# Patient Record
Sex: MALE | Race: WHITE
[De-identification: names, ages, dates, MRNs, and addresses within clinical notes are randomized per-mention and may not be internally consistent; named-entity substitution may affect disease eponyms.]

---

## 2017-01-10 ENCOUNTER — PRIOR ORIGINAL RECORDS (OUTPATIENT)
Dept: OTHER | Age: 82
End: 2017-01-10

## 2017-02-10 ENCOUNTER — HOSPITAL ENCOUNTER (EMERGENCY)
Facility: HOSPITAL | Age: 82
Discharge: HOME | End: 2017-02-10
Attending: EMERGENCY MEDICINE
Payer: MEDICARE

## 2017-02-10 VITALS
WEIGHT: 154.31 LBS | DIASTOLIC BLOOD PRESSURE: 51 MMHG | OXYGEN SATURATION: 95 % | SYSTOLIC BLOOD PRESSURE: 127 MMHG | HEIGHT: 69 IN | HEART RATE: 61 BPM | RESPIRATION RATE: 16 BRPM | BODY MASS INDEX: 22.85 KG/M2 | TEMPERATURE: 98 F

## 2017-02-10 DIAGNOSIS — R04.0 RIGHT-SIDED EPISTAXIS: Primary | ICD-10-CM

## 2017-02-10 PROCEDURE — 30903 CONTROL OF NOSEBLEED: CPT

## 2017-02-10 PROCEDURE — 99283 EMERGENCY DEPT VISIT LOW MDM: CPT

## 2017-02-10 RX ORDER — AMOXICILLIN 875 MG/1
875 TABLET, COATED ORAL ONCE
Status: COMPLETED | OUTPATIENT
Start: 2017-02-10 | End: 2017-02-10

## 2017-02-10 RX ORDER — AMOXICILLIN 875 MG/1
875 TABLET, COATED ORAL 2 TIMES DAILY
Qty: 20 TABLET | Refills: 0 | Status: SHIPPED | OUTPATIENT
Start: 2017-02-10 | End: 2017-02-20

## 2017-02-10 NOTE — ED NOTES
Spoke to [employer], patient's daughter. Went over discharge instructions/follow up/meds with [employer] and she verbalized understanding. Per [employer], her sister Lupis West is on her way to  patient.

## 2017-02-10 NOTE — ED PROVIDER NOTES
Patient Seen in: [facility] Emergency Department    History   Patient presents with:  Nose Bleed (nasopharyngeal)    Stated Complaint: nose bleed    HPI    Patient is a pleasant 42-year-old male, presenting for evaluation of nosebleed.   Patient stat mg by mouth daily with food. No family history on file.       Smoking Status: Former Smoker                   Packs/Day: 0.00  Years:           Types: Cigarettes    Smokeless Status: Never Used                          Review of Systems    Positive fo Reviewed - No data to display    MDM     Patient was placed on the cart and evaluated. 5.5 cm rapid Rhino was placed in the right nostril without difficulty. Balloon was inflated and secured.     Patient was observed in the emergency department and reeval

## 2017-03-08 ENCOUNTER — PRIOR ORIGINAL RECORDS (OUTPATIENT)
Dept: OTHER | Age: 82
End: 2017-03-08

## 2017-03-11 ENCOUNTER — HOSPITAL ENCOUNTER (OUTPATIENT)
Dept: LAB | Facility: HOSPITAL | Age: 82
Discharge: HOME | End: 2017-03-11
Attending: INTERNAL MEDICINE
Payer: MEDICARE

## 2017-03-11 ENCOUNTER — PRIOR ORIGINAL RECORDS (OUTPATIENT)
Dept: OTHER | Age: 82
End: 2017-03-11

## 2017-03-11 LAB
BASOPHILS # BLD AUTO: 0.03 X10(3) UL (ref 0–0.1)
BASOPHILS NFR BLD AUTO: 0.5 %
BUN BLD-MCNC: 13 MG/DL (ref 8–20)
CALCIUM BLD-MCNC: 9.2 MG/DL (ref 8.3–10.3)
CHLORIDE: 108 MMOL/L (ref 101–111)
CO2: 29 MMOL/L (ref 22–32)
CREAT BLD-MCNC: 1.06 MG/DL (ref 0.7–1.3)
EOSINOPHIL # BLD AUTO: 0.33 X10(3) UL (ref 0–0.3)
EOSINOPHIL NFR BLD AUTO: 5 %
ERYTHROCYTE [DISTWIDTH] IN BLOOD BY AUTOMATED COUNT: 13.2 % (ref 11.5–16)
GLUCOSE BLD-MCNC: 106 MG/DL (ref 70–99)
HCT VFR BLD AUTO: 42.9 % (ref 37–53)
HGB BLD-MCNC: 14.5 G/DL (ref 13–17)
IMMATURE GRANULOCYTE COUNT: 0.02 X10(3) UL (ref 0–1)
IMMATURE GRANULOCYTE RATIO %: 0.3 %
LYMPHOCYTES # BLD AUTO: 0.72 X10(3) UL (ref 0.9–4)
LYMPHOCYTES NFR BLD AUTO: 10.9 %
MCH RBC QN AUTO: 32.9 PG (ref 27–33.2)
MCHC RBC AUTO-ENTMCNC: 33.8 G/DL (ref 31–37)
MCV RBC AUTO: 97.3 FL (ref 80–99)
MONOCYTES # BLD AUTO: 0.59 X10(3) UL (ref 0.1–0.6)
MONOCYTES NFR BLD AUTO: 8.9 %
NEUTROPHIL ABS PRELIM: 4.94 X10 (3) UL (ref 1.3–6.7)
NEUTROPHILS # BLD AUTO: 4.94 X10(3) UL (ref 1.3–6.7)
NEUTROPHILS NFR BLD AUTO: 74.4 %
PLATELET # BLD AUTO: 165 10(3)UL (ref 150–450)
POTASSIUM SERPL-SCNC: 4.4 MMOL/L (ref 3.6–5.1)
RBC # BLD AUTO: 4.41 X10(6)UL (ref 3.8–5.8)
RED CELL DISTRIBUTION WIDTH-SD: 47.4 FL (ref 35.1–46.3)
SODIUM SERPL-SCNC: 144 MMOL/L (ref 136–144)
WBC # BLD AUTO: 6.6 X10(3) UL (ref 4–13)

## 2017-03-11 PROCEDURE — 80048 BASIC METABOLIC PNL TOTAL CA: CPT | Performed by: INTERNAL MEDICINE

## 2017-03-11 PROCEDURE — 85025 COMPLETE CBC W/AUTO DIFF WBC: CPT | Performed by: INTERNAL MEDICINE

## 2017-03-11 PROCEDURE — 36415 COLL VENOUS BLD VENIPUNCTURE: CPT | Performed by: INTERNAL MEDICINE

## 2017-03-13 ENCOUNTER — PRIOR ORIGINAL RECORDS (OUTPATIENT)
Dept: OTHER | Age: 82
End: 2017-03-13

## 2017-03-16 LAB
BUN: 13 MG/DL
CALCIUM: 9.2 MG/DL
CHLORIDE: 108 MEQ/L
CREATININE, SERUM: 1.06 MG/DL
GLUCOSE: 106 MG/DL
HEMATOCRIT: 42.9 %
HEMOGLOBIN: 14.5 G/DL
PLATELETS: 165 K/UL
POTASSIUM, SERUM: 4.4 MEQ/L
RED BLOOD COUNT: 4.41 X 10-6/U
SODIUM: 144 MEQ/L
WHITE BLOOD COUNT: 6.6 X 10-3/U

## 2017-03-20 ENCOUNTER — MYAURORA ACCOUNT LINK (OUTPATIENT)
Dept: OTHER | Age: 82
End: 2017-03-20

## 2017-03-20 ENCOUNTER — PRIOR ORIGINAL RECORDS (OUTPATIENT)
Dept: OTHER | Age: 82
End: 2017-03-20

## 2017-04-05 ENCOUNTER — MYAURORA ACCOUNT LINK (OUTPATIENT)
Dept: OTHER | Age: 82
End: 2017-04-05

## 2017-07-02 ENCOUNTER — HOSPITAL ENCOUNTER (OUTPATIENT)
Age: 82
Discharge: HOME | End: 2017-07-02
Attending: FAMILY MEDICINE
Payer: MEDICARE

## 2017-07-02 ENCOUNTER — APPOINTMENT (OUTPATIENT)
Dept: GENERAL RADIOLOGY | Age: 82
End: 2017-07-02
Attending: FAMILY MEDICINE
Payer: MEDICARE

## 2017-07-02 VITALS
OXYGEN SATURATION: 95 % | RESPIRATION RATE: 16 BRPM | DIASTOLIC BLOOD PRESSURE: 55 MMHG | HEIGHT: 69 IN | SYSTOLIC BLOOD PRESSURE: 112 MMHG | BODY MASS INDEX: 23.7 KG/M2 | TEMPERATURE: 98 F | WEIGHT: 160 LBS | HEART RATE: 62 BPM

## 2017-07-02 DIAGNOSIS — T07.XXXA MULTIPLE CONTUSIONS: ICD-10-CM

## 2017-07-02 DIAGNOSIS — S66.912A WRIST STRAIN, LEFT, INITIAL ENCOUNTER: ICD-10-CM

## 2017-07-02 DIAGNOSIS — S63.259A CLOSED DISLOCATION OF FINGER OF LEFT HAND, INITIAL ENCOUNTER: Primary | ICD-10-CM

## 2017-07-02 PROCEDURE — 99204 OFFICE O/P NEW MOD 45 MIN: CPT

## 2017-07-02 PROCEDURE — 99214 OFFICE O/P EST MOD 30 MIN: CPT

## 2017-07-02 PROCEDURE — 29130 APPL FINGER SPLINT STATIC: CPT

## 2017-07-02 PROCEDURE — 73140 X-RAY EXAM OF FINGER(S): CPT | Performed by: FAMILY MEDICINE

## 2017-07-02 PROCEDURE — 73110 X-RAY EXAM OF WRIST: CPT | Performed by: FAMILY MEDICINE

## 2017-07-02 RX ORDER — FLUTICASONE PROPIONATE AND SALMETEROL 500; 50 UG/1; UG/1
1 POWDER RESPIRATORY (INHALATION) 2 TIMES DAILY
COMMUNITY
End: 2017-11-16 | Stop reason: ALTCHOICE
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
## 2017-09-27 ENCOUNTER — PRIOR ORIGINAL RECORDS (OUTPATIENT)
Dept: OTHER | Age: 82
End: 2017-09-27

## 2017-09-30 ENCOUNTER — HOSPITAL ENCOUNTER (OUTPATIENT)
Dept: GENERAL RADIOLOGY | Age: 82
Discharge: HOME | End: 2017-09-30
Attending: INTERNAL MEDICINE
Payer: MEDICARE

## 2017-09-30 DIAGNOSIS — R05.9 COUGH: ICD-10-CM

## 2017-09-30 PROCEDURE — 71020 XR CHEST PA + LAT CHEST (CPT=71020): CPT | Performed by: INTERNAL MEDICINE

## 2017-11-16 ENCOUNTER — APPOINTMENT (OUTPATIENT)
Dept: CT IMAGING | Facility: HOSPITAL | Age: 82
End: 2017-11-16
Attending: EMERGENCY MEDICINE
Payer: MEDICARE

## 2017-11-16 ENCOUNTER — HOSPITAL ENCOUNTER (OUTPATIENT)
Age: 82
Discharge: ANOTHER HOSPITAL | End: 2017-11-16
Attending: FAMILY MEDICINE
Payer: MEDICARE

## 2017-11-16 ENCOUNTER — APPOINTMENT (OUTPATIENT)
Dept: GENERAL RADIOLOGY | Facility: HOSPITAL | Age: 82
End: 2017-11-16
Attending: EMERGENCY MEDICINE
Payer: MEDICARE

## 2017-11-16 ENCOUNTER — HOSPITAL ENCOUNTER (EMERGENCY)
Facility: HOSPITAL | Age: 82
Discharge: HOME | End: 2017-11-16
Attending: EMERGENCY MEDICINE
Payer: MEDICARE

## 2017-11-16 VITALS
HEART RATE: 70 BPM | RESPIRATION RATE: 20 BRPM | SYSTOLIC BLOOD PRESSURE: 139 MMHG | HEIGHT: 69 IN | OXYGEN SATURATION: 98 % | DIASTOLIC BLOOD PRESSURE: 60 MMHG | BODY MASS INDEX: 22.96 KG/M2 | WEIGHT: 155 LBS | TEMPERATURE: 98 F

## 2017-11-16 VITALS
HEIGHT: 69 IN | OXYGEN SATURATION: 96 % | RESPIRATION RATE: 18 BRPM | SYSTOLIC BLOOD PRESSURE: 113 MMHG | HEART RATE: 62 BPM | WEIGHT: 160 LBS | BODY MASS INDEX: 23.7 KG/M2 | DIASTOLIC BLOOD PRESSURE: 48 MMHG | TEMPERATURE: 98 F

## 2017-11-16 DIAGNOSIS — S51.811A SKIN TEAR OF RIGHT FOREARM WITHOUT COMPLICATION, INITIAL ENCOUNTER: ICD-10-CM

## 2017-11-16 DIAGNOSIS — S50.01XA CONTUSION OF RIGHT ELBOW, INITIAL ENCOUNTER: ICD-10-CM

## 2017-11-16 DIAGNOSIS — S80.01XA CONTUSION OF RIGHT KNEE, INITIAL ENCOUNTER: Primary | ICD-10-CM

## 2017-11-16 DIAGNOSIS — S09.90XA INJURY OF HEAD, INITIAL ENCOUNTER: Primary | ICD-10-CM

## 2017-11-16 DIAGNOSIS — S80.01XA CONTUSION OF RIGHT KNEE AND LOWER LEG, INITIAL ENCOUNTER: ICD-10-CM

## 2017-11-16 DIAGNOSIS — S80.11XA CONTUSION OF RIGHT KNEE AND LOWER LEG, INITIAL ENCOUNTER: ICD-10-CM

## 2017-11-16 DIAGNOSIS — R29.6 UNWITNESSED FALL: ICD-10-CM

## 2017-11-16 PROCEDURE — 99284 EMERGENCY DEPT VISIT MOD MDM: CPT

## 2017-11-16 PROCEDURE — 73700 CT LOWER EXTREMITY W/O DYE: CPT | Performed by: EMERGENCY MEDICINE

## 2017-11-16 PROCEDURE — 76377 3D RENDER W/INTRP POSTPROCES: CPT | Performed by: EMERGENCY MEDICINE

## 2017-11-16 PROCEDURE — 99215 OFFICE O/P EST HI 40 MIN: CPT

## 2017-11-16 PROCEDURE — 73560 X-RAY EXAM OF KNEE 1 OR 2: CPT | Performed by: EMERGENCY MEDICINE

## 2017-11-16 RX ORDER — FLUTICASONE PROPIONATE AND SALMETEROL 250; 50 UG/1; UG/1
1 POWDER RESPIRATORY (INHALATION) EVERY 12 HOURS SCHEDULED
Status: ON HOLD | COMMUNITY
End: 2019-10-26

## 2017-11-16 RX ORDER — ACETAMINOPHEN 500 MG
1000 TABLET ORAL ONCE
Status: COMPLETED | OUTPATIENT
Start: 2017-11-16 | End: 2017-11-16

## 2017-11-17 NOTE — ED INITIAL ASSESSMENT (HPI)
Pt to c/o right knee pain and swelling - S/P fall 2x on Tue 11/14/17. Pt lives alone at [facility], fall was unwitnessed. Pt claims no LOC, but felt \"I was about to. Aubree Sue \".  Pt was sent from [address] [place] [address] for further eval.

## 2017-11-17 NOTE — ED PROVIDER NOTES
Patient Seen in: [address] [facility]    History   Patient presents with:  Fall (musculoskeletal, neurologic)    Stated Complaint: fall x2 days hurt right knee and right arm     HPI    80year old male presents for head injury, right 175.3 cm (5' 9\")   Wt 70.3 kg   SpO2 98%   BMI 22.89 kg/m²         Physical Exam   Constitutional: He is oriented to person, place, and time. He appears well-developed and well-nourished. HENT:   Head: Normocephalic and atraumatic.    Right Ear: Tympanic Tyrell [id number] [phone]  Today  For re-check        Medications Prescribed:  Discharge Medication List as of 11/16/2017  7:16 PM

## 2017-11-17 NOTE — ED PROVIDER NOTES
Patient Seen in: [facility] Emergency Department    History   Patient presents with:  Head Neck Injury (neurologic, musculoskeletal)    Stated Complaint: fell on Tuesday. Pain in right leg and arm.   Seen at urgent care and sent in f*    HPI    This (Temporal)   Resp 18   Ht 175.3 cm (5' 9\")   Wt 72.6 kg   SpO2 96%   BMI 23.63 kg/m²         Physical Exam    GENERAL: Awake, alert oriented x3, nontoxic appearing. SKIN: Normal, warm, and dry. HEENT: Atraumatic. No scalp hematomas.   Pupils equally ro to marked osteoarthritic changes are noted. Finding suspicious for CPPD are noted. Cortical lucency on the lateral view in the ventral femoral metaphysis is noted. Please correlate with targeted physical examination for pain at this site.  If the patient h right knee x-ray was obtained which showed osteoarthritis. Tiny suprapatellar joint effusion. There was a cortical lucency on the lateral view which could have been artifact. Follow-up CT was done which showed no fracture.   Family is comfortable taking p

## 2017-11-17 NOTE — ED NOTES
Gel foam placed to r forearm and r elbow noted no active bleeding at this time pt awaiting ct scan results

## 2017-11-17 NOTE — ED NOTES
Pt presents with daugther awake and alert to norm with complaint of R  knee pain and R  arm pain noted dressing to arm

## 2018-01-01 ENCOUNTER — HOSPITAL (OUTPATIENT)
Dept: OTHER | Age: 83
End: 2018-01-01
Attending: INTERNAL MEDICINE

## 2018-01-01 ENCOUNTER — DIAGNOSTIC TRANS (OUTPATIENT)
Dept: OTHER | Age: 83
End: 2018-01-01

## 2018-01-01 LAB
2009 H1N1 SUBTYPE (RF1N1): NOT DETECTED
ADENOVIRUS (RADENO): NOT DETECTED
ALBUMIN FLD-MCNC: 1.8 GM/DL
ALBUMIN SERPL-MCNC: 2.7 GM/DL (ref 3.6–5.1)
ALBUMIN/GLOB SERPL: 0.7 {RATIO} (ref 1–2.4)
ALP SERPL-CCNC: 75 UNIT/L (ref 45–117)
ALT SERPL-CCNC: 24 UNIT/L
AMORPH SED URNS QL MICRO: ABNORMAL
AMYLASE FLD-CCNC: 93 UNIT/L
ANALYZER ANC (IANC): ABNORMAL
ANION GAP SERPL CALC-SCNC: 12 MMOL/L (ref 10–20)
ANION GAP SERPL CALC-SCNC: 13 MMOL/L (ref 10–20)
ANION GAP SERPL CALC-SCNC: 14 MMOL/L (ref 10–20)
ANION GAP SERPL CALC-SCNC: 14 MMOL/L (ref 10–20)
ANION GAP SERPL CALC-SCNC: 16 MMOL/L (ref 10–20)
APPEARANCE FLD: NORMAL
APPEARANCE FLD: YELLOW
APPEARANCE UR: CLEAR
APTT PPP: 30 SECONDS (ref 22–32)
APTT PPP: NORMAL S
AST SERPL-CCNC: 33 UNIT/L
BACTERIA #/AREA URNS HPF: ABNORMAL /HPF
BASOPHILS # BLD: 0 THOUSAND/MCL (ref 0–0.3)
BASOPHILS # BLD: 0 THOUSAND/MCL (ref 0–0.3)
BASOPHILS NFR BLD: 0 %
BASOPHILS NFR BLD: 1 %
BASOPHILS NFR BRONCH MANUAL: NORMAL %
BILIRUB SERPL-MCNC: 0.5 MG/DL (ref 0.2–1)
BILIRUB UR QL: NEGATIVE
BOCAVIRUS (RBOCA): NOT DETECTED
BUN SERPL-MCNC: 14 MG/DL (ref 6–20)
BUN SERPL-MCNC: 22 MG/DL (ref 6–20)
BUN SERPL-MCNC: 23 MG/DL (ref 6–20)
BUN SERPL-MCNC: 25 MG/DL (ref 6–20)
BUN SERPL-MCNC: 29 MG/DL (ref 6–20)
BUN/CREAT SERPL: 13 (ref 7–25)
BUN/CREAT SERPL: 20 (ref 7–25)
BUN/CREAT SERPL: 20 (ref 7–25)
BUN/CREAT SERPL: 22 (ref 7–25)
BUN/CREAT SERPL: 26 (ref 7–25)
C. PNEUMONIAE (RCHLP): NOT DETECTED
CALCIUM SERPL-MCNC: 8 MG/DL (ref 8.4–10.2)
CALCIUM SERPL-MCNC: 8 MG/DL (ref 8.4–10.2)
CALCIUM SERPL-MCNC: 8.4 MG/DL (ref 8.4–10.2)
CALCIUM SERPL-MCNC: 8.5 MG/DL (ref 8.4–10.2)
CALCIUM SERPL-MCNC: 8.8 MG/DL (ref 8.4–10.2)
CAOX CRY URNS QL MICRO: ABNORMAL
CHLORIDE: 102 MMOL/L (ref 98–107)
CHLORIDE: 102 MMOL/L (ref 98–107)
CHLORIDE: 103 MMOL/L (ref 98–107)
CHLORIDE: 105 MMOL/L (ref 98–107)
CHLORIDE: 106 MMOL/L (ref 98–107)
CHOLEST SERPL-MCNC: 173 MG/DL
CHOLEST/HDLC SERPL: 5.1 {RATIO}
CO2 SERPL-SCNC: 26 MMOL/L (ref 21–32)
COLOR UR: YELLOW
CORONAVIRUS 229E (RC229E): NOT DETECTED
CORONAVIRUS HKU1 (RCHKU1): NOT DETECTED
CORONAVIRUS NL63 (RCNL63): NOT DETECTED
CORONAVIRUS OC43 (RCO43): NOT DETECTED
CREAT SERPL-MCNC: 1.09 MG/DL (ref 0.67–1.17)
CREAT SERPL-MCNC: 1.1 MG/DL (ref 0.67–1.17)
CREAT SERPL-MCNC: 1.13 MG/DL (ref 0.67–1.17)
CREAT SERPL-MCNC: 1.15 MG/DL (ref 0.67–1.17)
DIFFERENTIAL METHOD BLD: ABNORMAL
DIFFERENTIAL METHOD BLD: ABNORMAL
EOSINOPHIL # BLD: 0 THOUSAND/MCL (ref 0.1–0.5)
EOSINOPHIL # BLD: 0.1 THOUSAND/MCL (ref 0.1–0.5)
EOSINOPHIL NFR BLD: 1 %
EOSINOPHIL NFR BLD: 2 %
EOSINOPHIL NFR BRONCH MANUAL: NORMAL %
EPITH CASTS #/AREA URNS LPF: ABNORMAL /[LPF]
ERYTHROCYTE [DISTWIDTH] IN BLOOD: 13.2 % (ref 11–15)
ERYTHROCYTE [DISTWIDTH] IN BLOOD: 13.2 % (ref 11–15)
ERYTHROCYTE [DISTWIDTH] IN BLOOD: 13.3 % (ref 11–15)
FATTY CASTS #/AREA URNS LPF: ABNORMAL /[LPF]
GLOBULIN SER-MCNC: 3.7 GM/DL (ref 2–4)
GLUCOSE FLD-MCNC: 113 MG/DL
GLUCOSE SERPL-MCNC: 100 MG/DL (ref 65–99)
GLUCOSE SERPL-MCNC: 103 MG/DL (ref 65–99)
GLUCOSE SERPL-MCNC: 104 MG/DL (ref 65–99)
GLUCOSE SERPL-MCNC: 110 MG/DL (ref 65–99)
GLUCOSE SERPL-MCNC: 131 MG/DL (ref 65–99)
GLUCOSE UR-MCNC: NEGATIVE MG/DL
GLYCOHEMOGLOBIN: 5.6 % (ref 4.5–5.6)
GRAN CASTS #/AREA URNS LPF: ABNORMAL /[LPF]
HCT VFR FLD CALC: <1 %
HDLC SERPL-MCNC: 34 MG/DL
HEMATOCRIT: 41.8 % (ref 39–51)
HEMATOCRIT: 43.3 % (ref 39–51)
HEMATOCRIT: 48.8 % (ref 39–51)
HGB BLD-MCNC: 13.8 GM/DL (ref 13–17)
HGB BLD-MCNC: 13.8 GM/DL (ref 13–17)
HGB BLD-MCNC: 15.6 GM/DL (ref 13–17)
HGB UR QL: ABNORMAL
HYALINE CASTS #/AREA URNS LPF: ABNORMAL /LPF (ref 0–5)
IMM GRANULOCYTES # BLD AUTO: 0 THOUSAND/MCL (ref 0–0.2)
IMM GRANULOCYTES # BLD AUTO: 0 THOUSAND/MCL (ref 0–0.2)
IMM GRANULOCYTES NFR BLD: 0 %
IMM GRANULOCYTES NFR BLD: 1 %
INFLUENZA A SUBTYPE H1 (RFLH1): NOT DETECTED
INFLUENZA A SUBTYPE H3 (RFLH3): NOT DETECTED
INFLUENZA A UNSUBTYPABLE (RIAU): ABNORMAL
INFLUENZA B VIRUS (XFLUB): NOT DETECTED
INR PPP: 1.1
KETONES UR-MCNC: ABNORMAL MG/DL
LDH FLD-CCNC: 77 UNIT/L
LDLC SERPL CALC-MCNC: 123 MG/DL
LEUKOCYTE ESTERASE UR QL STRIP: ABNORMAL
LYMPHOCYTES # BLD: 0.5 THOUSAND/MCL (ref 1–4)
LYMPHOCYTES # BLD: 0.7 THOUSAND/MCL (ref 1–4)
LYMPHOCYTES NFR BLD: 13 %
LYMPHOCYTES NFR BLD: 9 %
LYMPHOCYTES NFR BRONCH MANUAL: 45 %
M. PNEUMONIAE (RMYPP): NOT DETECTED
MAGNESIUM SERPL-MCNC: 1.9 MG/DL (ref 1.7–2.4)
MAGNESIUM SERPL-MCNC: 1.9 MG/DL (ref 1.7–2.4)
MCH RBC QN AUTO: 31.7 PG (ref 26–34)
MCH RBC QN AUTO: 31.9 PG (ref 26–34)
MCH RBC QN AUTO: 32.9 PG (ref 26–34)
MCHC RBC AUTO-ENTMCNC: 31.9 GM/DL (ref 32–36.5)
MCHC RBC AUTO-ENTMCNC: 32 GM/DL (ref 32–36.5)
MCHC RBC AUTO-ENTMCNC: 33 GM/DL (ref 32–36.5)
MCV RBC AUTO: 99.3 FL (ref 78–100)
MCV RBC AUTO: 99.8 FL (ref 78–100)
MCV RBC AUTO: 99.8 FL (ref 78–100)
MESOTHL CELL NFR FLD: 2 %
METAPNEUMOVIRUS (RMETA): NOT DETECTED
MIXED CELL CASTS #/AREA URNS LPF: ABNORMAL /[LPF]
MONOCYTES # BLD: 0.5 THOUSAND/MCL (ref 0.3–0.9)
MONOCYTES # BLD: 0.6 THOUSAND/MCL (ref 0.3–0.9)
MONOCYTES NFR BLD: 12 %
MONOCYTES NFR BLD: 8 %
MONOS+MACROS NFR BRONCH MANUAL: 44 %
MUCOUS THREADS URNS QL MICRO: PRESENT
NEUTROPHILS # BLD: 2.8 THOUSAND/MCL (ref 1.8–7.7)
NEUTROPHILS # BLD: 6.8 THOUSAND/MCL (ref 1.8–7.7)
NEUTROPHILS NFR BLD: 72 %
NEUTROPHILS NFR BLD: 81 %
NEUTS SEG NFR BLD: ABNORMAL %
NEUTS SEG NFR BLD: ABNORMAL %
NEUTS SEG NFR FLD: 9 %
NITRITE UR QL: NEGATIVE
NONHDLC SERPL-MCNC: 139 MG/DL
NRBC (NRBCRE): 0 /100 WBC
NT-PROBNP SERPL-MCNC: 2131 PG/ML
NUC CELL # FLD: 163 /MCL (ref 0–1000)
PARAINFLUENZA, TYPE 1 (RPAR1): NOT DETECTED
PARAINFLUENZA, TYPE 2 (RPAR2): POSITIVE
PARAINFLUENZA, TYPE 3 (RPAR3): NOT DETECTED
PARAINFLUENZA, TYPE 4 (RPAR4): NOT DETECTED
PATH REPORT, CYTOLOGY: NORMAL
PH UR: 5 UNIT (ref 5–7)
PLATELET # BLD: 115 THOUSAND/MCL (ref 140–450)
PLATELET # BLD: 118 THOUSAND/MCL (ref 140–450)
PLATELET # BLD: 143 THOUSAND/MCL (ref 140–450)
POTASSIUM SERPL-SCNC: 3.5 MMOL/L (ref 3.4–5.1)
POTASSIUM SERPL-SCNC: 3.7 MMOL/L (ref 3.4–5.1)
POTASSIUM SERPL-SCNC: 4 MMOL/L (ref 3.4–5.1)
POTASSIUM SERPL-SCNC: 4.1 MMOL/L (ref 3.4–5.1)
POTASSIUM SERPL-SCNC: 4.3 MMOL/L (ref 3.4–5.1)
POTASSIUM SERPL-SCNC: 4.4 MMOL/L (ref 3.4–5.1)
PROCALCITONIN SERPL IA-MCNC: 0.33 NG/ML
PROCALCITONIN SERPL IA-MCNC: 1.57 NG/ML
PROT FLD-MCNC: 3 GM/DL
PROT SERPL-MCNC: 6.4 GM/DL (ref 6.4–8.2)
PROT UR QL: NEGATIVE MG/DL
PROTHROMBIN TIME: 11.1 SECONDS (ref 9.7–11.8)
PROTHROMBIN TIME: NORMAL
RBC # BLD: 4.19 MILLION/MCL (ref 4.5–5.9)
RBC # BLD: 4.36 MILLION/MCL (ref 4.5–5.9)
RBC # BLD: 4.89 MILLION/MCL (ref 4.5–5.9)
RBC #/AREA URNS HPF: ABNORMAL /HPF (ref 0–3)
RBC CASTS #/AREA URNS LPF: ABNORMAL /[LPF]
RENAL EPI CELLS #/AREA URNS HPF: ABNORMAL /[HPF]
RHINOVIRUS/ENTEROVIRUS (RRHINO): NOT DETECTED
RSV, SUBTYPE A (RRSVA): NOT DETECTED
RSV, SUBTYPE B (RRSVB): NOT DETECTED
SERVICE CMNT-IMP: NORMAL
SODIUM SERPL-SCNC: 136 MMOL/L (ref 135–145)
SODIUM SERPL-SCNC: 139 MMOL/L (ref 135–145)
SODIUM SERPL-SCNC: 140 MMOL/L (ref 135–145)
SODIUM SERPL-SCNC: 140 MMOL/L (ref 135–145)
SODIUM SERPL-SCNC: 142 MMOL/L (ref 135–145)
SP GR UR: 1.01 (ref 1–1.03)
SPECIMEN SOURCE FLD: NORMAL
SPECIMEN SOURCE: ABNORMAL
SPECIMEN SOURCE: ABNORMAL
SPECIMEN SOURCE: NORMAL
SPECIMEN SOURCE: NORMAL
SPECIMEN VOL FLD: 1450 ML
SPERM URNS QL MICRO: ABNORMAL
SQUAMOUS #/AREA URNS HPF: ABNORMAL /HPF (ref 0–5)
T VAGINALIS URNS QL MICRO: ABNORMAL
TRI-PHOS CRY URNS QL MICRO: ABNORMAL
TRIGLYCERIDE (TRIGP): 82 MG/DL
TROPONIN I SERPL HS-MCNC: 0.04 NG/ML
TROPONIN I SERPL HS-MCNC: 0.5 NG/ML
TROPONIN I SERPL HS-MCNC: 0.66 NG/ML
URATE CRY URNS QL MICRO: ABNORMAL
URINE REFLEX: ABNORMAL
URNS CMNT MICRO: ABNORMAL
UROBILINOGEN UR QL: 0.2 MG/DL (ref 0–1)
WAXY CASTS #/AREA URNS LPF: ABNORMAL /[LPF]
WBC # BLD: 3.9 THOUSAND/MCL (ref 4.2–11)
WBC # BLD: 5 THOUSAND/MCL (ref 4.2–11)
WBC # BLD: 8.3 THOUSAND/MCL (ref 4.2–11)
WBC #/AREA URNS HPF: ABNORMAL /HPF (ref 0–5)
WBC CASTS #/AREA URNS LPF: ABNORMAL /[LPF]
YEAST HYPHAE URNS QL MICRO: ABNORMAL
YEAST URNS QL MICRO: ABNORMAL

## 2018-01-09 ENCOUNTER — PRIOR ORIGINAL RECORDS (OUTPATIENT)
Dept: OTHER | Age: 83
End: 2018-01-09

## 2018-04-09 ENCOUNTER — MYAURORA ACCOUNT LINK (OUTPATIENT)
Dept: OTHER | Age: 83
End: 2018-04-09

## 2018-08-01 ENCOUNTER — MYAURORA ACCOUNT LINK (OUTPATIENT)
Dept: OTHER | Age: 83
End: 2018-08-01

## 2018-08-09 ENCOUNTER — HOSPITAL ENCOUNTER (OUTPATIENT)
Age: 83
Discharge: HOME | End: 2018-08-09
Attending: FAMILY MEDICINE
Payer: MEDICARE

## 2018-08-09 VITALS
BODY MASS INDEX: 22.19 KG/M2 | HEIGHT: 70 IN | OXYGEN SATURATION: 98 % | TEMPERATURE: 98 F | DIASTOLIC BLOOD PRESSURE: 59 MMHG | SYSTOLIC BLOOD PRESSURE: 138 MMHG | WEIGHT: 155 LBS | RESPIRATION RATE: 20 BRPM | HEART RATE: 69 BPM

## 2018-08-09 DIAGNOSIS — S51.812A SKIN TEAR OF LEFT FOREARM WITHOUT COMPLICATION, INITIAL ENCOUNTER: Primary | ICD-10-CM

## 2018-08-09 PROCEDURE — 99212 OFFICE O/P EST SF 10 MIN: CPT

## 2018-08-10 NOTE — ED NOTES
Wound care with Mepilex dressing, wrapped with Kerlix. Pt. Tolerated well. Daughter reports understanding .

## 2018-08-10 NOTE — ED INITIAL ASSESSMENT (HPI)
ADULT VIDEOFLUOROSCOPIC SWALLOWING STUDY    Admission Date: 8/3/2018  Evaluation Date: 08/08/18  Radiologist: Dr. Charlotte Patterson   Diet Recommendations - Solids: Mechanical soft chopped  Diet Recommendations - Liquid: Nectar thick (Chin tuck/ C/o skin tear to left forearm from 2 days ago. Pt. Does take aspirin daily. Family has been bandaging it, but feel it needs more assessment. aspiration      Family/Patient Goals:  \"I want to drink regular water. \"    ASSESSMENT   DYSPHAGIA ASSESSMENT  Test completed in conjunction with Radiologist.  Patient Positioned: Upright. Patient Viewed: Lateral.  Patient Alertness: Fully alert.   Consis sips;Multiple swallows  Effectiveness: Yes     PUREE  Oral Phase of Swallow (VFSS - Puree): Impaired  Bolus Retrieval (VFSS - Puree): Intact  Bilabial Seal (VFSS - Puree): Intact  Bolus Formation (VFSS - Puree): Intact  Bolus Propulsion (VFSS - Puree):  Imp thin liquids reduced the amount of laryngeal penetration but continued to be present. Chin tuck posture eliminated penetration on nectar thick liquids.   Trace-minimal vallecular residue was present on all consistencies post the swallow which cleared with satisfaction. INTERDISCIPLINARY COMMUNICATION  Reviewed results with Radiologist; agreement verbalized. Patient Experiencing Pain: No                FOLLOW UP  Treatment Plan/Recommendations: Dysphagia therapy; Aspiration precautions  Number of Visits

## 2018-08-10 NOTE — ED PROVIDER NOTES
Patient Seen in: [address] [facility]    History   Patient presents with:  Wound    Stated Complaint: arm laceration x2 days    HPI    80-year-old male with a history of CHF, COPD, coronary disease, and dementia suffered a skin tear General: Pleasant elderly male in no acute distress accompanied by daughter. Neuro: Sensation intact. No focal deficit  Vascular: +2 radial and ulnar pulses of the left upper extremity.   Normal cap refill of all fingers  Extremity: No acute deformity not

## 2018-08-16 ENCOUNTER — OFFICE VISIT (OUTPATIENT)
Dept: WOUND CARE | Facility: HOSPITAL | Age: 83
End: 2018-08-16
Attending: NURSE PRACTITIONER
Payer: MEDICARE

## 2018-08-16 DIAGNOSIS — L90.9 ATROPHY, SKIN: ICD-10-CM

## 2018-08-16 DIAGNOSIS — S41.102D UNSPECIFIED OPEN WOUND OF LEFT UPPER ARM, SUBSEQUENT ENCOUNTER: Primary | ICD-10-CM

## 2018-08-16 PROCEDURE — 99214 OFFICE O/P EST MOD 30 MIN: CPT

## 2018-08-16 NOTE — PROGRESS NOTES
Subjective    Chief Complaint  This information was obtained from the patient  Patients here for initial visit for forearm. Patients stated happen last Tuesday cause by skin tear.     Allergies  Coumadin, ephedrine    HPI  This information was obtained from Musculoskeletal: Assistive Devices (walker), Other (neck pain which patient was treated with steroid by pcp)  Integumentary (Hair/Skin/Nails):  Other (skin tear), Prone to Skin Tears (atrophic skin consistent with age)  Psychiatric: Memory Loss    Patient d Gastrointestinal (GI):  Abdomen is soft and non-distended without tenderness. Bowel sounds active. Musculoskeletal:  Gait independent with assistance of walker. Integumentary (Hair, Skin)  see wound documentation.      Wound #1 Left Forearm is an ac Wound type: - traumatic skin tear  Perfusion assessed by palpation of pulses.         Discussed with patient and daughter the aspects of wound healing including:  blood flow in/out, wound bed optimization including moist wound healing, removal of necrosis a

## 2018-08-20 ENCOUNTER — PRIOR ORIGINAL RECORDS (OUTPATIENT)
Dept: OTHER | Age: 83
End: 2018-08-20

## 2018-08-23 ENCOUNTER — OFFICE VISIT (OUTPATIENT)
Dept: WOUND CARE | Facility: HOSPITAL | Age: 83
End: 2018-08-23
Attending: NURSE PRACTITIONER
Payer: MEDICARE

## 2018-08-23 DIAGNOSIS — L90.9 ATROPHIC SPOTS OF SKIN: ICD-10-CM

## 2018-08-23 DIAGNOSIS — S41.102D UNSPECIFIED OPEN WOUND OF LEFT UPPER ARM, SUBSEQUENT ENCOUNTER: Primary | ICD-10-CM

## 2018-08-23 PROCEDURE — 99213 OFFICE O/P EST LOW 20 MIN: CPT

## 2018-08-23 NOTE — PROGRESS NOTES
Subjective    Chief Complaint  This information was obtained from the patient  Patients here for a f/u to skin tear on left forearm. Pt and daughter deny any problems with wound.     Allergies  Coumadin, ephedrine    HPI  This information was obtained from for patient. Pulse Regular and wnl for patient. Ana María Drayton Respirations easy and unlabored. Temperature wnl. Weight normal for height. . Appearance neat and clean. Appears in no acute distress. Well nourished and well developed.  Height/Length: 70 in (177.8 cm), Weigh - saad  Hydrofera ready  Other: - adherent roll gauze  Other: - surgilast  Change dressing 3x/week    Follow-Up Appointments  Return Appointment in 1 week. Misc/Additional Orders  Supplement with a daily multivitamin.   S/S of Infection    Care summary

## 2018-08-30 ENCOUNTER — OFFICE VISIT (OUTPATIENT)
Dept: WOUND CARE | Facility: HOSPITAL | Age: 83
End: 2018-08-30
Attending: NURSE PRACTITIONER
Payer: MEDICARE

## 2018-08-30 DIAGNOSIS — S41.102D UNSPECIFIED OPEN WOUND OF LEFT UPPER ARM, SUBSEQUENT ENCOUNTER: Primary | ICD-10-CM

## 2018-08-30 PROCEDURE — 99213 OFFICE O/P EST LOW 20 MIN: CPT

## 2018-08-30 NOTE — PROGRESS NOTES
Subjective    Chief Complaint  This information was obtained from the patient  Patients here for a f/u to skin tear on left forearm. Pt daughter stated it is doing well.      Allergies  Coumadin, ephedrine    HPI  This information was obtained from the brenda Depression    Additional Information  Medication reconciliation completed at today's visit. : Yes        Objective    Constitutional  bp wnl for patient. Pulse Regular and wnl for patient. Itzel Band Respirations easy and unlabored. Temperature wnl.  Weight normal fo RETURN TO CLINIC AS NEEDED. Misc/Additional Orders  Supplement with a daily multivitamin. S/S of Infection    Care summary  Wound type: - traumatic skin tear  Wound improving. No s/s of infection. - RESOLVED  Perfusion assessed by palpation of pulses.

## 2018-09-06 ENCOUNTER — APPOINTMENT (OUTPATIENT)
Dept: WOUND CARE | Facility: HOSPITAL | Age: 83
End: 2018-09-06
Attending: NURSE PRACTITIONER
Payer: MEDICARE

## 2018-11-07 ENCOUNTER — MYAURORA ACCOUNT LINK (OUTPATIENT)
Dept: OTHER | Age: 83
End: 2018-11-07

## 2019-01-01 ENCOUNTER — TELEPHONE (OUTPATIENT)
Dept: CARDIOLOGY | Age: 84
End: 2019-01-01

## 2019-01-01 ENCOUNTER — OFFICE VISIT (OUTPATIENT)
Dept: CARDIOLOGY | Age: 84
End: 2019-01-01

## 2019-01-01 ENCOUNTER — ANCILLARY ORDERS (OUTPATIENT)
Dept: CARDIOLOGY | Age: 84
End: 2019-01-01

## 2019-01-01 ENCOUNTER — PRIOR ORIGINAL RECORDS (OUTPATIENT)
Dept: OTHER | Age: 84
End: 2019-01-01

## 2019-01-01 ENCOUNTER — APPOINTMENT (OUTPATIENT)
Dept: CARDIOLOGY | Age: 84
End: 2019-01-01

## 2019-01-01 ENCOUNTER — EXTERNAL RECORD (OUTPATIENT)
Dept: HEALTH INFORMATION MANAGEMENT | Facility: OTHER | Age: 84
End: 2019-01-01

## 2019-01-01 ENCOUNTER — MYAURORA ACCOUNT LINK (OUTPATIENT)
Dept: OTHER | Age: 84
End: 2019-01-01

## 2019-01-01 ENCOUNTER — APPOINTMENT (OUTPATIENT)
Dept: CARDIOLOGY | Age: 84
End: 2019-01-01
Attending: INTERNAL MEDICINE

## 2019-01-01 ENCOUNTER — ANCILLARY PROCEDURE (OUTPATIENT)
Dept: CARDIOLOGY | Age: 84
End: 2019-01-01
Attending: INTERNAL MEDICINE

## 2019-01-01 VITALS
SYSTOLIC BLOOD PRESSURE: 118 MMHG | WEIGHT: 153 LBS | HEART RATE: 65 BPM | HEIGHT: 67 IN | DIASTOLIC BLOOD PRESSURE: 70 MMHG | BODY MASS INDEX: 24.01 KG/M2

## 2019-01-01 VITALS
WEIGHT: 163 LBS | SYSTOLIC BLOOD PRESSURE: 110 MMHG | HEART RATE: 64 BPM | BODY MASS INDEX: 25.58 KG/M2 | HEIGHT: 67 IN | DIASTOLIC BLOOD PRESSURE: 46 MMHG

## 2019-01-01 VITALS
BODY MASS INDEX: 25.27 KG/M2 | HEIGHT: 67 IN | DIASTOLIC BLOOD PRESSURE: 60 MMHG | HEART RATE: 65 BPM | SYSTOLIC BLOOD PRESSURE: 124 MMHG | WEIGHT: 161 LBS

## 2019-01-01 VITALS
BODY MASS INDEX: 24.11 KG/M2 | SYSTOLIC BLOOD PRESSURE: 110 MMHG | HEIGHT: 66 IN | DIASTOLIC BLOOD PRESSURE: 40 MMHG | HEART RATE: 60 BPM | WEIGHT: 150 LBS

## 2019-01-01 VITALS
WEIGHT: 152 LBS | DIASTOLIC BLOOD PRESSURE: 52 MMHG | HEIGHT: 66 IN | BODY MASS INDEX: 24.43 KG/M2 | HEART RATE: 68 BPM | SYSTOLIC BLOOD PRESSURE: 100 MMHG

## 2019-01-01 VITALS
HEIGHT: 67 IN | WEIGHT: 152 LBS | BODY MASS INDEX: 23.86 KG/M2 | DIASTOLIC BLOOD PRESSURE: 62 MMHG | HEART RATE: 80 BPM | SYSTOLIC BLOOD PRESSURE: 124 MMHG

## 2019-01-01 VITALS
WEIGHT: 154 LBS | HEIGHT: 67 IN | BODY MASS INDEX: 24.17 KG/M2 | HEART RATE: 62 BPM | SYSTOLIC BLOOD PRESSURE: 130 MMHG | DIASTOLIC BLOOD PRESSURE: 56 MMHG

## 2019-01-01 VITALS — WEIGHT: 161 LBS | SYSTOLIC BLOOD PRESSURE: 134 MMHG | DIASTOLIC BLOOD PRESSURE: 62 MMHG | HEART RATE: 72 BPM

## 2019-01-01 VITALS
HEIGHT: 66 IN | SYSTOLIC BLOOD PRESSURE: 104 MMHG | BODY MASS INDEX: 24.43 KG/M2 | HEART RATE: 66 BPM | DIASTOLIC BLOOD PRESSURE: 54 MMHG | WEIGHT: 152 LBS

## 2019-01-01 VITALS
HEART RATE: 72 BPM | DIASTOLIC BLOOD PRESSURE: 70 MMHG | SYSTOLIC BLOOD PRESSURE: 118 MMHG | WEIGHT: 162 LBS | HEIGHT: 67 IN | BODY MASS INDEX: 25.43 KG/M2

## 2019-01-01 VITALS — DIASTOLIC BLOOD PRESSURE: 58 MMHG | HEART RATE: 65 BPM | SYSTOLIC BLOOD PRESSURE: 122 MMHG | WEIGHT: 161 LBS

## 2019-01-01 VITALS
BODY MASS INDEX: 23.46 KG/M2 | WEIGHT: 146 LBS | HEIGHT: 66 IN | SYSTOLIC BLOOD PRESSURE: 110 MMHG | HEART RATE: 72 BPM | DIASTOLIC BLOOD PRESSURE: 40 MMHG

## 2019-01-01 VITALS — HEART RATE: 64 BPM | DIASTOLIC BLOOD PRESSURE: 62 MMHG | SYSTOLIC BLOOD PRESSURE: 140 MMHG

## 2019-01-01 DIAGNOSIS — I50.30 HEART FAILURE WITH PRESERVED LEFT VENTRICULAR FUNCTION (HFPEF) (CMD): Primary | ICD-10-CM

## 2019-01-01 DIAGNOSIS — J90 PLEURAL EFFUSION: ICD-10-CM

## 2019-01-01 DIAGNOSIS — Z95.0 PACEMAKER: ICD-10-CM

## 2019-01-01 DIAGNOSIS — I48.0 AF (PAROXYSMAL ATRIAL FIBRILLATION) (CMD): Primary | ICD-10-CM

## 2019-01-01 DIAGNOSIS — I48.0 AF (PAROXYSMAL ATRIAL FIBRILLATION) (CMD): ICD-10-CM

## 2019-01-01 DIAGNOSIS — Z09 HOSPITAL DISCHARGE FOLLOW-UP: Primary | ICD-10-CM

## 2019-01-01 DIAGNOSIS — I50.21 HEART FAILURE, LEFT SYSTOLIC, ACUTE (CMD): ICD-10-CM

## 2019-01-01 DIAGNOSIS — I70.203 ATHEROSCLEROSIS OF NATIVE ARTERY OF BOTH LOWER EXTREMITIES, WITH UNSPECIFIED PRESENCE OF CLINICAL MANIFESTATION (CMD): Primary | ICD-10-CM

## 2019-01-01 DIAGNOSIS — I48.0 PAROXYSMAL ATRIAL FIBRILLATION (CMD): ICD-10-CM

## 2019-01-01 DIAGNOSIS — I48.91 ATRIAL FIBRILLATION, UNSPECIFIED TYPE (CMD): ICD-10-CM

## 2019-01-01 PROCEDURE — 99213 OFFICE O/P EST LOW 20 MIN: CPT | Performed by: INTERNAL MEDICINE

## 2019-01-01 PROCEDURE — 99214 OFFICE O/P EST MOD 30 MIN: CPT | Performed by: NURSE PRACTITIONER

## 2019-01-01 PROCEDURE — 99221 1ST HOSP IP/OBS SF/LOW 40: CPT | Performed by: INTERNAL MEDICINE

## 2019-01-01 PROCEDURE — 99232 SBSQ HOSP IP/OBS MODERATE 35: CPT | Performed by: NURSE PRACTITIONER

## 2019-01-01 PROCEDURE — 99232 SBSQ HOSP IP/OBS MODERATE 35: CPT | Performed by: INTERNAL MEDICINE

## 2019-01-01 PROCEDURE — 93294 REM INTERROG EVL PM/LDLS PM: CPT | Performed by: INTERNAL MEDICINE

## 2019-01-01 PROCEDURE — 99214 OFFICE O/P EST MOD 30 MIN: CPT | Performed by: INTERNAL MEDICINE

## 2019-01-01 PROCEDURE — 99223 1ST HOSP IP/OBS HIGH 75: CPT | Performed by: INTERNAL MEDICINE

## 2019-01-01 PROCEDURE — 99233 SBSQ HOSP IP/OBS HIGH 50: CPT | Performed by: INTERNAL MEDICINE

## 2019-01-01 PROCEDURE — 93288 INTERROG EVL PM/LDLS PM IP: CPT | Performed by: NURSE PRACTITIONER

## 2019-01-01 PROCEDURE — X1114 CARDIAC DEVICE HOME CHECK - REMOTE UNSCHEDULED: HCPCS | Performed by: INTERNAL MEDICINE

## 2019-01-01 PROCEDURE — 99291 CRITICAL CARE FIRST HOUR: CPT | Performed by: INTERNAL MEDICINE

## 2019-01-01 RX ORDER — ESCITALOPRAM OXALATE 5 MG/1
TABLET ORAL
COMMUNITY
End: 2019-01-01 | Stop reason: SDUPTHER

## 2019-01-01 RX ORDER — FLUTICASONE PROPIONATE AND SALMETEROL 250; 50 UG/1; UG/1
POWDER RESPIRATORY (INHALATION)
COMMUNITY
End: 2019-01-01 | Stop reason: SDUPTHER

## 2019-01-01 RX ORDER — FUROSEMIDE 20 MG/1
20 TABLET ORAL
COMMUNITY
End: 2019-01-01 | Stop reason: ALTCHOICE

## 2019-01-01 RX ORDER — BUPROPION HYDROCHLORIDE 150 MG/1
TABLET, EXTENDED RELEASE ORAL
COMMUNITY
End: 2019-01-01 | Stop reason: SDUPTHER

## 2019-01-01 RX ORDER — PEAK FLOW METER
EACH MISCELLANEOUS
Refills: 1 | COMMUNITY
Start: 2019-01-01

## 2019-01-01 RX ORDER — FLUTICASONE PROPIONATE AND SALMETEROL 250; 50 UG/1; UG/1
1 POWDER RESPIRATORY (INHALATION) EVERY 12 HOURS PRN
COMMUNITY
Start: 2015-12-18

## 2019-01-01 RX ORDER — ALFUZOSIN HYDROCHLORIDE 10 MG/1
10 TABLET, EXTENDED RELEASE ORAL
COMMUNITY
Start: 2019-01-01

## 2019-01-01 RX ORDER — FUROSEMIDE 20 MG/1
TABLET ORAL
Qty: 30 TABLET | Refills: 5 | Status: SHIPPED | OUTPATIENT
Start: 2019-01-01 | End: 2019-01-01 | Stop reason: SDUPTHER

## 2019-01-01 RX ORDER — LEVOCETIRIZINE DIHYDROCHLORIDE 5 MG/1
5 TABLET, FILM COATED ORAL
COMMUNITY

## 2019-01-01 RX ORDER — ASPIRIN 81 MG/1
81 TABLET ORAL DAILY
COMMUNITY
Start: 2018-01-09

## 2019-01-01 RX ORDER — METOPROLOL SUCCINATE 25 MG/1
12.5 TABLET, EXTENDED RELEASE ORAL
COMMUNITY
End: 2019-01-01 | Stop reason: ALTCHOICE

## 2019-01-01 RX ORDER — FUROSEMIDE 20 MG/1
20 TABLET ORAL DAILY
COMMUNITY
Start: 2019-01-01 | End: 2019-01-01 | Stop reason: SDUPTHER

## 2019-01-01 RX ORDER — FUROSEMIDE 40 MG/1
40 TABLET ORAL DAILY
COMMUNITY

## 2019-01-01 RX ORDER — METOPROLOL SUCCINATE 25 MG/1
TABLET, EXTENDED RELEASE ORAL
COMMUNITY
End: 2019-01-01 | Stop reason: SDUPTHER

## 2019-01-01 RX ORDER — CITALOPRAM 20 MG/1
20 TABLET ORAL DAILY
COMMUNITY

## 2019-01-01 RX ORDER — LISINOPRIL 2.5 MG/1
2.5 TABLET ORAL DAILY
COMMUNITY
Start: 2019-01-01 | End: 2019-01-01 | Stop reason: SDUPTHER

## 2019-01-01 RX ORDER — MONTELUKAST SODIUM 10 MG/1
10 TABLET ORAL AT BEDTIME
COMMUNITY
Start: 2015-12-18

## 2019-01-01 RX ORDER — FUROSEMIDE 40 MG/1
40 TABLET ORAL DAILY
COMMUNITY
End: 2019-01-01 | Stop reason: HOSPADM

## 2019-01-01 RX ORDER — MEMANTINE HYDROCHLORIDE 10 MG/1
TABLET ORAL
COMMUNITY

## 2019-01-01 RX ORDER — METOPROLOL SUCCINATE 25 MG/1
12.5 TABLET, EXTENDED RELEASE ORAL DAILY
COMMUNITY
End: 2019-01-01 | Stop reason: CLARIF

## 2019-01-01 RX ORDER — MEMANTINE HYDROCHLORIDE 10 MG/1
10 TABLET ORAL 2 TIMES DAILY
COMMUNITY
Start: 2015-12-18 | End: 2019-01-01 | Stop reason: SDUPTHER

## 2019-01-01 RX ORDER — LISINOPRIL 2.5 MG/1
2.5 TABLET ORAL DAILY
Qty: 90 TABLET | Refills: 3 | Status: SHIPPED | OUTPATIENT
Start: 2019-01-01 | End: 2019-01-01 | Stop reason: ALTCHOICE

## 2019-01-01 RX ORDER — PRIMIDONE 50 MG/1
TABLET ORAL
COMMUNITY
End: 2019-01-01 | Stop reason: SDUPTHER

## 2019-01-01 RX ORDER — CITALOPRAM HYDROBROMIDE 10 MG/1
10 TABLET ORAL DAILY
COMMUNITY
End: 2019-01-01 | Stop reason: DRUGHIGH

## 2019-01-01 RX ORDER — PRIMIDONE 50 MG/1
50 TABLET ORAL 3 TIMES DAILY
COMMUNITY
Start: 2015-12-18

## 2019-01-01 RX ORDER — MONTELUKAST SODIUM 10 MG/1
TABLET ORAL
COMMUNITY
End: 2019-01-01 | Stop reason: SDUPTHER

## 2019-01-01 RX ORDER — AMIODARONE HYDROCHLORIDE 200 MG/1
200 TABLET ORAL
COMMUNITY
Start: 2019-01-01

## 2019-01-01 RX ORDER — METOPROLOL SUCCINATE 25 MG/1
25 TABLET, EXTENDED RELEASE ORAL 2 TIMES DAILY
COMMUNITY
Start: 2018-08-20 | End: 2019-01-01 | Stop reason: DRUGHIGH

## 2019-01-01 RX ORDER — BUPROPION HYDROCHLORIDE 300 MG/1
TABLET ORAL
Refills: 0 | COMMUNITY
Start: 2019-01-01 | End: 2019-01-01 | Stop reason: CLARIF

## 2019-01-01 RX ORDER — IPRATROPIUM BROMIDE AND ALBUTEROL SULFATE 2.5; .5 MG/3ML; MG/3ML
3 SOLUTION RESPIRATORY (INHALATION)
COMMUNITY
Start: 2019-01-01

## 2019-01-01 RX ORDER — LISINOPRIL 2.5 MG/1
TABLET ORAL
Qty: 30 TABLET | Refills: 3 | Status: SHIPPED | OUTPATIENT
Start: 2019-01-01 | End: 2019-01-01 | Stop reason: SDUPTHER

## 2019-01-01 SDOH — HEALTH STABILITY: MENTAL HEALTH: HOW OFTEN DO YOU HAVE A DRINK CONTAINING ALCOHOL?: NEVER

## 2019-01-01 SDOH — SOCIAL STABILITY: SOCIAL NETWORK: ARE YOU MARRIED, WIDOWED, DIVORCED, SEPARATED, NEVER MARRIED, OR LIVING WITH A PARTNER?: WIDOWED

## 2019-01-01 ASSESSMENT — ENCOUNTER SYMPTOMS
BRUISES/BLEEDS EASILY: 0
WEIGHT GAIN: 0
HEMATOCHEZIA: 0
SUSPICIOUS LESIONS: 0
HEMOPTYSIS: 0
COUGH: 0
ALLERGIC/IMMUNOLOGIC COMMENTS: NO NEW FOOD ALLERGIES
WEIGHT LOSS: 0
MEMORY LOSS: 1
CHILLS: 0
BRUISES/BLEEDS EASILY: 0
ALLERGIC/IMMUNOLOGIC COMMENTS: NO NEW FOOD ALLERGIES
CHILLS: 0
WEIGHT GAIN: 0
HEMATOCHEZIA: 0
HEMOPTYSIS: 0
FEVER: 0
COUGH: 0
WEIGHT LOSS: 0
FEVER: 0
SUSPICIOUS LESIONS: 0

## 2019-01-01 ASSESSMENT — PATIENT HEALTH QUESTIONNAIRE - PHQ9
SUM OF ALL RESPONSES TO PHQ9 QUESTIONS 1 AND 2: 2
2. FEELING DOWN, DEPRESSED OR HOPELESS: NOT AT ALL
1. LITTLE INTEREST OR PLEASURE IN DOING THINGS: MORE THAN HALF THE DAYS
SUM OF ALL RESPONSES TO PHQ9 QUESTIONS 1 AND 2: 2

## 2019-01-16 ENCOUNTER — HOSPITAL ENCOUNTER (OUTPATIENT)
Dept: CV DIAGNOSTICS | Facility: HOSPITAL | Age: 84
Discharge: HOME | End: 2019-01-16
Attending: INTERNAL MEDICINE
Payer: MEDICARE

## 2019-01-16 DIAGNOSIS — Z95.1 S/P CABG (CORONARY ARTERY BYPASS GRAFT): ICD-10-CM

## 2019-01-16 DIAGNOSIS — I25.10 CAD (CORONARY ARTERY DISEASE), NATIVE CORONARY ARTERY: ICD-10-CM

## 2019-01-16 DIAGNOSIS — I48.0 AF (PAROXYSMAL ATRIAL FIBRILLATION) (HCC): ICD-10-CM

## 2019-01-16 PROCEDURE — 78452 HT MUSCLE IMAGE SPECT MULT: CPT | Performed by: INTERNAL MEDICINE

## 2019-01-16 PROCEDURE — 93017 CV STRESS TEST TRACING ONLY: CPT | Performed by: INTERNAL MEDICINE

## 2019-01-16 PROCEDURE — 93018 CV STRESS TEST I&R ONLY: CPT | Performed by: INTERNAL MEDICINE

## 2019-01-30 ENCOUNTER — HOSPITAL ENCOUNTER (OUTPATIENT)
Dept: GENERAL RADIOLOGY | Facility: HOSPITAL | Age: 84
Discharge: HOME | End: 2019-01-30
Attending: INTERNAL MEDICINE
Payer: MEDICARE

## 2019-01-30 DIAGNOSIS — I25.10 CAD (CORONARY ARTERY DISEASE): ICD-10-CM

## 2019-01-30 DIAGNOSIS — J90 PLEURAL EFFUSION: ICD-10-CM

## 2019-01-30 DIAGNOSIS — I48.0 AF (PAROXYSMAL ATRIAL FIBRILLATION) (HCC): ICD-10-CM

## 2019-01-30 PROCEDURE — 71046 X-RAY EXAM CHEST 2 VIEWS: CPT | Performed by: INTERNAL MEDICINE

## 2019-02-22 ENCOUNTER — HOSPITAL ENCOUNTER (INPATIENT)
Facility: HOSPITAL | Age: 84
LOS: 5 days | Discharge: HOME HEALTH | DRG: 291 | End: 2019-02-27
Attending: HOSPITALIST | Admitting: HOSPITALIST
Payer: MEDICARE

## 2019-02-22 ENCOUNTER — APPOINTMENT (OUTPATIENT)
Dept: CT IMAGING | Facility: HOSPITAL | Age: 84
DRG: 291 | End: 2019-02-22
Attending: HOSPITALIST
Payer: MEDICARE

## 2019-02-22 DIAGNOSIS — J90 PLEURAL EFFUSION: Primary | ICD-10-CM

## 2019-02-22 DIAGNOSIS — J93.9 PNEUMOTHORAX ON RIGHT: ICD-10-CM

## 2019-02-22 PROBLEM — I50.9 CHF (CONGESTIVE HEART FAILURE) (HCC): Status: ACTIVE | Noted: 2019-02-22

## 2019-02-22 LAB
ANION GAP SERPL CALC-SCNC: 7 MMOL/L (ref 0–18)
BILIRUB UR QL STRIP.AUTO: NEGATIVE
BUN BLD-MCNC: 16 MG/DL (ref 7–18)
BUN/CREAT SERPL: 17 (ref 10–20)
CALCIUM BLD-MCNC: 8.4 MG/DL (ref 8.5–10.1)
CHLORIDE SERPL-SCNC: 106 MMOL/L (ref 98–107)
CLARITY UR REFRACT.AUTO: CLEAR
CO2 SERPL-SCNC: 29 MMOL/L (ref 21–32)
CREAT BLD-MCNC: 0.94 MG/DL (ref 0.7–1.3)
D-DIMER: 1.61 UG/ML FEU (ref 0–0.49)
DEPRECATED RDW RBC AUTO: 49.9 FL (ref 35.1–46.3)
ERYTHROCYTE [DISTWIDTH] IN BLOOD BY AUTOMATED COUNT: 13.4 % (ref 11–15)
GLUCOSE BLD-MCNC: 102 MG/DL (ref 70–99)
GLUCOSE UR STRIP.AUTO-MCNC: NEGATIVE MG/DL
HCT VFR BLD AUTO: 43.6 % (ref 39–53)
HGB BLD-MCNC: 14.1 G/DL (ref 13–17.5)
KETONES UR STRIP.AUTO-MCNC: NEGATIVE MG/DL
LACTATE SERPL-SCNC: 1 MMOL/L (ref 0.4–2)
LEUKOCYTE ESTERASE UR QL STRIP.AUTO: NEGATIVE
MCH RBC QN AUTO: 32.6 PG (ref 26–34)
MCHC RBC AUTO-ENTMCNC: 32.3 G/DL (ref 31–37)
MCV RBC AUTO: 100.7 FL (ref 80–100)
NITRITE UR QL STRIP.AUTO: NEGATIVE
NT-PROBNP SERPL-MCNC: 1825 PG/ML (ref ?–450)
OSMOLALITY SERPL CALC.SUM OF ELEC: 295 MOSM/KG (ref 275–295)
PH UR STRIP.AUTO: 7 [PH] (ref 4.5–8)
PLATELET # BLD AUTO: 204 10(3)UL (ref 150–450)
POTASSIUM SERPL-SCNC: 4.6 MMOL/L (ref 3.5–5.1)
PROT UR STRIP.AUTO-MCNC: NEGATIVE MG/DL
RBC # BLD AUTO: 4.33 X10(6)UL (ref 3.8–5.8)
RBC UR QL AUTO: NEGATIVE
SODIUM SERPL-SCNC: 142 MMOL/L (ref 136–145)
SP GR UR STRIP.AUTO: 1.01 (ref 1–1.03)
UROBILINOGEN UR STRIP.AUTO-MCNC: <2 MG/DL
WBC # BLD AUTO: 8.1 X10(3) UL (ref 4–11)

## 2019-02-22 PROCEDURE — 99223 1ST HOSP IP/OBS HIGH 75: CPT | Performed by: HOSPITALIST

## 2019-02-22 PROCEDURE — 71275 CT ANGIOGRAPHY CHEST: CPT | Performed by: HOSPITALIST

## 2019-02-22 RX ORDER — MONTELUKAST SODIUM 10 MG/1
10 TABLET ORAL NIGHTLY
Status: DISCONTINUED | OUTPATIENT
Start: 2019-02-22 | End: 2019-02-27

## 2019-02-22 RX ORDER — ACETAMINOPHEN 325 MG/1
650 TABLET ORAL EVERY 6 HOURS PRN
Status: DISCONTINUED | OUTPATIENT
Start: 2019-02-22 | End: 2019-02-27

## 2019-02-22 RX ORDER — MEMANTINE HYDROCHLORIDE 10 MG/1
10 TABLET ORAL 2 TIMES DAILY
Status: DISCONTINUED | OUTPATIENT
Start: 2019-02-22 | End: 2019-02-27

## 2019-02-22 RX ORDER — FUROSEMIDE 10 MG/ML
20 INJECTION INTRAMUSCULAR; INTRAVENOUS ONCE
Status: COMPLETED | OUTPATIENT
Start: 2019-02-22 | End: 2019-02-22

## 2019-02-22 RX ORDER — BUPROPION HYDROCHLORIDE 150 MG/1
150 TABLET ORAL DAILY
Status: DISCONTINUED | OUTPATIENT
Start: 2019-02-23 | End: 2019-02-27

## 2019-02-22 RX ORDER — METOPROLOL SUCCINATE 25 MG/1
25 TABLET, EXTENDED RELEASE ORAL
Status: DISCONTINUED | OUTPATIENT
Start: 2019-02-22 | End: 2019-02-27

## 2019-02-22 RX ORDER — HEPARIN SODIUM 5000 [USP'U]/ML
5000 INJECTION, SOLUTION INTRAVENOUS; SUBCUTANEOUS EVERY 8 HOURS SCHEDULED
Status: DISCONTINUED | OUTPATIENT
Start: 2019-02-22 | End: 2019-02-22

## 2019-02-22 RX ORDER — CITALOPRAM 20 MG/1
20 TABLET ORAL DAILY
COMMUNITY

## 2019-02-22 RX ORDER — ENOXAPARIN SODIUM 100 MG/ML
40 INJECTION SUBCUTANEOUS NIGHTLY
Status: DISCONTINUED | OUTPATIENT
Start: 2019-02-22 | End: 2019-02-23

## 2019-02-22 RX ORDER — PRIMIDONE 50 MG/1
50 TABLET ORAL 3 TIMES DAILY
Status: DISCONTINUED | OUTPATIENT
Start: 2019-02-22 | End: 2019-02-27

## 2019-02-22 RX ORDER — ASPIRIN 81 MG/1
81 TABLET ORAL DAILY
Status: DISCONTINUED | OUTPATIENT
Start: 2019-02-23 | End: 2019-02-27

## 2019-02-23 ENCOUNTER — APPOINTMENT (OUTPATIENT)
Dept: GENERAL RADIOLOGY | Facility: HOSPITAL | Age: 84
DRG: 291 | End: 2019-02-23
Attending: INTERNAL MEDICINE
Payer: MEDICARE

## 2019-02-23 ENCOUNTER — APPOINTMENT (OUTPATIENT)
Dept: CT IMAGING | Facility: HOSPITAL | Age: 84
DRG: 291 | End: 2019-02-23
Attending: INTERNAL MEDICINE
Payer: MEDICARE

## 2019-02-23 ENCOUNTER — APPOINTMENT (OUTPATIENT)
Dept: CV DIAGNOSTICS | Facility: HOSPITAL | Age: 84
DRG: 291 | End: 2019-02-23
Attending: INTERNAL MEDICINE
Payer: MEDICARE

## 2019-02-23 ENCOUNTER — APPOINTMENT (OUTPATIENT)
Dept: ULTRASOUND IMAGING | Facility: HOSPITAL | Age: 84
DRG: 291 | End: 2019-02-23
Attending: INTERNAL MEDICINE
Payer: MEDICARE

## 2019-02-23 LAB
ALLENS TEST: POSITIVE
ANION GAP SERPL CALC-SCNC: 7 MMOL/L (ref 0–18)
ARTERIAL BLD GAS O2 SATURATION: 95 % (ref 92–100)
ARTERIAL BLOOD GAS BASE EXCESS: 1.1
ARTERIAL BLOOD GAS HCO3: 25.3 MEQ/L (ref 22–26)
ARTERIAL BLOOD GAS PCO2: 38 MM HG (ref 35–45)
ARTERIAL BLOOD GAS PH: 7.44 (ref 7.35–7.45)
ARTERIAL BLOOD GAS PO2: 77 MM HG (ref 80–105)
BASOPHIL PLEURAL FLUID: 0 %
BUN BLD-MCNC: 15 MG/DL (ref 7–18)
BUN/CREAT SERPL: 14.4 (ref 10–20)
CALCIUM BLD-MCNC: 8.4 MG/DL (ref 8.5–10.1)
CALCULATED O2 SATURATION: 96 % (ref 92–100)
CARBOXYHEMOGLOBIN: 1.6 % SAT (ref 0–3)
CHLORIDE SERPL-SCNC: 105 MMOL/L (ref 98–107)
CO2 SERPL-SCNC: 30 MMOL/L (ref 21–32)
CREAT BLD-MCNC: 1.04 MG/DL (ref 0.7–1.3)
DEPRECATED RDW RBC AUTO: 48.2 FL (ref 35.1–46.3)
EOSINOPHIL PLEURAL FLUID: 3 %
ERYTHROCYTE [DISTWIDTH] IN BLOOD BY AUTOMATED COUNT: 13.4 % (ref 11–15)
GLUCOSE BLD-MCNC: 91 MG/DL (ref 70–99)
GLUCOSE PLR-MCNC: 101 MG/DL
HAV IGM SER QL: 2 MG/DL (ref 1.6–2.6)
HCT VFR BLD AUTO: 39.8 % (ref 39–53)
HEMATOCRIT PLEURAL FLUID: 0 %
HGB BLD-MCNC: 13.4 G/DL (ref 13–17.5)
L/M: 2 L/MIN
LDH FLD L TO P-CCNC: 82 U/L
LYMPHOCYTE PLEURAL FLUID: 56 %
MCH RBC QN AUTO: 33 PG (ref 26–34)
MCHC RBC AUTO-ENTMCNC: 33.7 G/DL (ref 31–37)
MCV RBC AUTO: 98 FL (ref 80–100)
METHEMOGLOBIN: 0.5 % SAT (ref 0.4–1.5)
MONO/MAC/HISTIO PLEURAL FLUID: 39 %
NEUTROPHIL PLEURAL FLUID: 2 %
OSMOLALITY SERPL CALC.SUM OF ELEC: 294 MOSM/KG (ref 275–295)
PATIENT TEMPERATURE: 97.6 F
PLATELET # BLD AUTO: 179 10(3)UL (ref 150–450)
PLEURAL FLUID PH: 7.52 (ref 7.2–7.5)
POTASSIUM SERPL-SCNC: 4.2 MMOL/L (ref 3.5–5.1)
PROT PLR-MCNC: 3.6 G/DL
RBC # BLD AUTO: 4.06 X10(6)UL (ref 3.8–5.8)
RBC PLEURAL FLUID: <3000 /MM3
SODIUM SERPL-SCNC: 142 MMOL/L (ref 136–145)
TOTAL CELLS COUNTED: 100
TOTAL HEMOGLOBIN: 13.9 G/DL (ref 12.6–17.4)
WBC # BLD AUTO: 6.7 X10(3) UL (ref 4–11)
WBC PLEURAL FLUID: 235 /MM3

## 2019-02-23 PROCEDURE — 71045 X-RAY EXAM CHEST 1 VIEW: CPT | Performed by: INTERNAL MEDICINE

## 2019-02-23 PROCEDURE — 0W993ZZ DRAINAGE OF RIGHT PLEURAL CAVITY, PERCUTANEOUS APPROACH: ICD-10-PCS | Performed by: INTERNAL MEDICINE

## 2019-02-23 PROCEDURE — 71250 CT THORAX DX C-: CPT | Performed by: INTERNAL MEDICINE

## 2019-02-23 PROCEDURE — 32555 ASPIRATE PLEURA W/ IMAGING: CPT | Performed by: INTERNAL MEDICINE

## 2019-02-23 PROCEDURE — 99232 SBSQ HOSP IP/OBS MODERATE 35: CPT | Performed by: HOSPITALIST

## 2019-02-23 RX ORDER — HEPARIN SODIUM 5000 [USP'U]/ML
5000 INJECTION, SOLUTION INTRAVENOUS; SUBCUTANEOUS EVERY 12 HOURS SCHEDULED
Status: DISCONTINUED | OUTPATIENT
Start: 2019-02-23 | End: 2019-02-27

## 2019-02-23 NOTE — CONSULTS
[facility]  Report of Consultation    Larisa Mcguire Patient Status:  Inpatient    1929 MRN [id number]   [facility] 8NE-A Attending Chino Kendrick MD   [address] Day # 1 PCP Som Rosales DO     Reason for Consultation:  Aurelia Khan Daily  •  Memantine HCl (NAMENDA) tab 10 mg, 10 mg, Oral, BID  •  Metoprolol Succinate ER (Toprol XL) 24 hr tab 25 mg, 25 mg, Oral, 2x Daily(Beta Blocker)  •  Montelukast Sodium (SINGULAIR) tab 10 mg, 10 mg, Oral, Nightly  •  primidone (MYSOLINE) tab 50 mg 2300 — — — 65 — 97 % —   02/22/19 2100 124/49 — — 62 — 97 % —   02/22/19 1941 (!) 168/63 97.6 °F (36.4 °C) Oral 64 18 93 % —   02/22/19 1900 — — — 64 — — —   02/22/19 1651 139/68 — — 74 17 97 % —   02/22/19 1649 150/68 — — 80 17 97 % —   02/22/19 1646 153/ n/a    Radiology:  CXR 2/22- large right pleural effusion  CT chest 2/22- large right effusion with compressive atelectasis, small left effusion.   Collection of contrast material within right heat    Assessment:  · Recurrent right pleural effusion- etiolog

## 2019-02-23 NOTE — PLAN OF CARE
Assumed care of pt at 2300. Pt denies pain. Pt is A & O x 4 w/ forgetfulness. Pt is V paced on tele, S1 and S2 present and pt denies cardiac symptoms. Lung sounds diminished w/ crackles at bases. Pt on 2L NC. Continuing to monitor. Abdomen soft and round.

## 2019-02-23 NOTE — PROGRESS NOTES
· [facility]      Subjective:  Up in chair - dyspnea markedly better after 2.8 liter thoracentesis    Objective:  108/71  123/47   Afebrile   AFib v pacing  BUN/cr 15/1.04     Neuro: awake/alert  HEENT: no JVD  Cardiac: S1 s2 regular  Lungs: c

## 2019-02-23 NOTE — H&P
[facility]  History and Physical     Trude Dies Patient Status:  Inpatient    1929 MRN [id number]   [facility] 8NE-A Attending Smiley Cooney MD   [facility] Day # 1 PCP Marcela Lala DO     Chief Complaint: sob deconditio Ephedrine                   Comment:hyperactive    Medications:    No current facility-administered medications on file prior to encounter.    Current Outpatient Medications on File Prior to Encounter:  Citalopram Hydrobromide 10 MG Oral Tab Take 5 mg by lesions. Psychiatric: Appropriate mood and affect.       Diagnostic Data:      Labs:  Recent Labs   Lab  02/22/19 1832 02/23/19   0515   WBC  8.1  6.7   HGB  14.1  13.4   MCV  100.7*  98.0   PLT  204.0  179.0       Recent Labs   Lab  02/22/19   1832  0

## 2019-02-23 NOTE — PROGRESS NOTES
[facility]  Progress Note     Zita Yu Patient Status:  Inpatient    1929 MRN [id number]   [facility] 8NE-A Attending Samantha Cheng MD   [facility] Day # 1 PCP Shruthi Jaramillo DO     Chief Complaint: SOB    S: Patient confu mg Subcutaneous Nightly       ASSESSMENT / PLAN:     1. Pleural effusion  1. pulm to see for thoracentesis  2. Will repeat CXR post thoracentesis  3. Not sure if he needs/ qualifies pleurex catheter at this time   2. Ischemic cardiomyopathy   1.  Cardiology

## 2019-02-23 NOTE — PROGRESS NOTES
[facility] Cardiology  Progress Note    Darren Bennett Patient Status:  Inpatient    1929 MRN [id number]   [facility] 8NE-A Attending Mony Simms MD   Hosp Day # 0 PCP Melquiades Aleman DO     Called on consult by [facility] push IV lasix  - cont beta- blocker  - ASA  - not a candidate for anticoagulation (h/o bleed, dementia, risk of fall) as decided in the past  - echo in am to assess LVEF - discrepancy from different hospiat    D/w pt and tele Nurses  Beryle Hampshire  02/22/20 hematuria. INTEG: no new rashes, lesions. MS: difficulty in walking. NEURO: memory loss, unsteady gait and wheelchair. HEM/LYMPH: easy bruising. ALL: no new food or enviornmental allergies.       PAST HISTORY: dementia, nose bleeds with xarelto and aspirin, to lie down. Was hypotensive with diuretics when hospitalized to [facility]. 2.  Pleural effusion moderate on the right on chest x-ray 1/30/2019. Now with progressive dyspnea, orthopnea. O2 sat 97% at rest today.   Previously required thoracentesi 12/18/15 Primidone             50MG      three times daily     Tatiana Hernandez / [facility]  02/21/2019 65

## 2019-02-23 NOTE — PROGRESS NOTES
Pulmonology:    Received call from Dr. Mariela Stringer. Results of the CT demonstrate evidence of a right base PTX with some extension to the apex, likely trapped lung but need to be cautious for PTX as well.   STAT CXR ordered for current baseline with repeat sc

## 2019-02-23 NOTE — PROCEDURES
[facility] Patient Status:  Inpatient    1929 MRN [id number]   [facility] 8NE-A Attending Dahiana Davenport MD   [facility] Day # 1 PCP Tonie Hutton DO       Pre-operative Diagnosis: recurrent right pleural effusion

## 2019-02-23 NOTE — PLAN OF CARE
Patient/Family Goals    • Patient/Family Long Term Goal Progressing    • Patient/Family Short Term Goal Progressing        Direct admit from Dr. Timbo Gonzalez office, V paced, AOx3 forgetful, 97% on room air, pt states SOB with exertion, expiratory wheezes, saf

## 2019-02-23 NOTE — PLAN OF CARE
Patient/Family Goals    • Patient/Family Long Term Goal Progressing    • Patient/Family Short Term Goal Progressing        RESPIRATORY - ADULT    • Achieves optimal ventilation and oxygenation Progressing        A/o x1-2 hes aware of his confusion and beco

## 2019-02-24 ENCOUNTER — APPOINTMENT (OUTPATIENT)
Dept: GENERAL RADIOLOGY | Facility: HOSPITAL | Age: 84
DRG: 291 | End: 2019-02-24
Attending: INTERNAL MEDICINE
Payer: MEDICARE

## 2019-02-24 ENCOUNTER — APPOINTMENT (OUTPATIENT)
Dept: CV DIAGNOSTICS | Facility: HOSPITAL | Age: 84
DRG: 291 | End: 2019-02-24
Attending: INTERNAL MEDICINE
Payer: MEDICARE

## 2019-02-24 PROCEDURE — 93306 TTE W/DOPPLER COMPLETE: CPT | Performed by: INTERNAL MEDICINE

## 2019-02-24 PROCEDURE — 71045 X-RAY EXAM CHEST 1 VIEW: CPT | Performed by: INTERNAL MEDICINE

## 2019-02-24 PROCEDURE — 99232 SBSQ HOSP IP/OBS MODERATE 35: CPT | Performed by: HOSPITALIST

## 2019-02-24 RX ORDER — FUROSEMIDE 20 MG/1
20 TABLET ORAL DAILY
Status: DISCONTINUED | OUTPATIENT
Start: 2019-02-24 | End: 2019-02-27

## 2019-02-24 RX ORDER — LISINOPRIL 2.5 MG/1
2.5 TABLET ORAL DAILY
Status: DISCONTINUED | OUTPATIENT
Start: 2019-02-24 | End: 2019-02-27

## 2019-02-24 NOTE — PLAN OF CARE
Patient/Family Goals    • Patient/Family Long Term Goal Progressing    • Patient/Family Short Term Goal Progressing        RESPIRATORY - ADULT    • Achieves optimal ventilation and oxygenation Progressing        Assumed care of patient at 0700.     A/o x2

## 2019-02-24 NOTE — PROGRESS NOTES
[facility]  Progress Note     Christa Bowens Patient Status:  Inpatient    1929 MRN [id number]   [facility] 8NE-A Attending Aster Posadas MD   [address] Day # 2 PCP Nato Chang DO     Chief Complaint: SOB    S: Patient denie Montelukast Sodium  10 mg Oral Nightly   • primidone  50 mg Oral TID       ASSESSMENT / PLAN:     1. Pleural effusion  1. s/p thoracentesis  2. Follow cytology and Cx results  3. diuresis  2. Iatrogenic pneumothorax  1. Asymptomatic  2.  Follow with serial

## 2019-02-24 NOTE — PLAN OF CARE
RESPIRATORY - ADULT    • Achieves optimal ventilation and oxygenation Progressing        Rcv'd A/o/2-3   Forgetful at times  Reported by day nurse as per daughter pt becomes more confused in hospital setting  Bed alarm noted  On tele with V-pacing  Heparin

## 2019-02-24 NOTE — PROGRESS NOTES
[facility]  Progress Note    Heather Andrade Patient Status:  Inpatient    1929 MRN [id number]   [facility] 8NE-A Attending Ashli Easton MD   [facility] Day # 2 PCP Antonia Wiley DO     Subjective:  Heather Andrade is a(n) 80 year [employer]    Cultures:  Right pleural fluid 2/23- pending, transudative    Radiology:  CXR 2/24- Shift of trapped air from base to apex, persistent basilar infiltrates/edema  CT chest 2/23 post thoracentesis- loculated air at the base and small apical PTX. worsened PTX and resp failure. Questions answered. She is agreeable to the procedure if needed. · Cardiac optimization. Prior inability to tolerate diuresis may have been in setting of concurrent infection, not on ACE or ARB.   Discussed w cardiology AP

## 2019-02-24 NOTE — PROGRESS NOTES
[facility]  Progress Note    Lawrence Wilson Patient Status:  Inpatient    1929 MRN [id number]   [facility] 8NE-A Attending Brina Barnes MD   [facility] Day # 2 PCP Christoph Pool DO       Assessment and Plan:  Patient Active Probl Medications:  furosemide (LASIX) tab 20 mg 20 mg Oral Daily   lisinopril (PRINIVIL,ZESTRIL) tab 2.5 mg 2.5 mg Oral Daily   Ipratropium Bromide (ATROVENT) 0.02 % nebulizer solution 0.5 mg 0.5 mg Nebulization Q6H PRN   Heparin Sodium (Porcine) 5000 UNIT/ML i

## 2019-02-25 ENCOUNTER — APPOINTMENT (OUTPATIENT)
Dept: GENERAL RADIOLOGY | Facility: HOSPITAL | Age: 84
DRG: 291 | End: 2019-02-25
Attending: INTERNAL MEDICINE
Payer: MEDICARE

## 2019-02-25 LAB
ALBUMIN SERPL-MCNC: 2.3 G/DL (ref 3.4–5)
ALP LIVER SERPL-CCNC: 62 U/L (ref 45–117)
ALT SERPL-CCNC: 13 U/L (ref 16–61)
ANION GAP SERPL CALC-SCNC: 5 MMOL/L (ref 0–18)
AST SERPL-CCNC: 12 U/L (ref 15–37)
BILIRUB DIRECT SERPL-MCNC: 0.1 MG/DL (ref 0–0.2)
BILIRUB SERPL-MCNC: 0.4 MG/DL (ref 0.1–2)
BUN BLD-MCNC: 25 MG/DL (ref 7–18)
BUN/CREAT SERPL: 22.1 (ref 10–20)
CALCIUM BLD-MCNC: 8 MG/DL (ref 8.5–10.1)
CHLORIDE SERPL-SCNC: 107 MMOL/L (ref 98–107)
CO2 SERPL-SCNC: 28 MMOL/L (ref 21–32)
CREAT BLD-MCNC: 1.13 MG/DL (ref 0.7–1.3)
GLUCOSE BLD-MCNC: 96 MG/DL (ref 70–99)
LDH SERPL L TO P-CCNC: 151 U/L (ref 87–241)
M PROTEIN MFR SERPL ELPH: 5.7 G/DL (ref 6.4–8.2)
OSMOLALITY SERPL CALC.SUM OF ELEC: 294 MOSM/KG (ref 275–295)
POTASSIUM SERPL-SCNC: 4.2 MMOL/L (ref 3.5–5.1)
SODIUM SERPL-SCNC: 140 MMOL/L (ref 136–145)

## 2019-02-25 PROCEDURE — 71045 X-RAY EXAM CHEST 1 VIEW: CPT | Performed by: INTERNAL MEDICINE

## 2019-02-25 PROCEDURE — 99232 SBSQ HOSP IP/OBS MODERATE 35: CPT | Performed by: HOSPITALIST

## 2019-02-25 RX ORDER — POLYETHYLENE GLYCOL 3350 17 G/17G
17 POWDER, FOR SOLUTION ORAL DAILY PRN
Status: DISCONTINUED | OUTPATIENT
Start: 2019-02-25 | End: 2019-02-27

## 2019-02-25 RX ORDER — DOCUSATE SODIUM 100 MG/1
100 CAPSULE, LIQUID FILLED ORAL 2 TIMES DAILY
Status: DISCONTINUED | OUTPATIENT
Start: 2019-02-25 | End: 2019-02-27

## 2019-02-25 NOTE — PROGRESS NOTES
02/25/19 1443   Clinical Encounter Type   Visited With Health care provider   Referral To Nurse;Physician  ( provided signed POLST form, already on file, dated 5/2015. Nurse to consult physician regarding Code Status on file.    to remain

## 2019-02-25 NOTE — PROGRESS NOTES
[facility]  Cardiology Progress Note    Pratima Troncoso Patient Status:  Inpatient    1929 MRN [id number]   [facility] 8NE-A Attending Shar Oglesby MD   [facility] Day # 3 PCP Breann Gonsalez DO     Subjective:  Sitting up in chair, Cardiac: iregular rate and rhythm, S1, S2 normal   Lungs: diminished bases   Abdomen: Soft, non-tender, + BS   Extremities: No edema   Skin: Warm and dry.      Medications:  • docusate sodium  100 mg Oral BID   • furosemide  20 mg Oral Daily   • lisinopri

## 2019-02-25 NOTE — CM/SW NOTE
MSW spoke to [employer] [facility] [place] [address] and sent updates/order via [facility].    9:55am  MSW called [phone] is from [place].

## 2019-02-25 NOTE — CM/SW NOTE
MSW spoke to r Ray Smith and discussed d/c plan is home with [facility]. [facility][note 2[facility] also pays for a private pay caregiver for an Aide for weekly showers.

## 2019-02-25 NOTE — DIETARY NOTE
[facility] R Luis     Admitting diagnosis:  Pleural Effusion  Dyspnea  CHF (congestive heart failure) ([facility] [address]    Ht:  5'10\" (8/9/2018)  Wt: 64.1 kg (141 lb 5 oz). This is  77% of IBW  BMI: Body mass index is 20.28 kg/m².

## 2019-02-25 NOTE — PLAN OF CARE
Assumed care of patient around 0730. Pt alert to self this a.m., 2L nasal cannula for pneumo, v-paced on tele monitor w/ underlying a-fib. Pt denies pain/discomfort or chest pain, SOB this a.m. C/o constipation, Dr. Dennys Kumar notified, miralax ordered.  Repor

## 2019-02-25 NOTE — PROGRESS NOTES
[facility]  Progress Note    Cynthia Kovacs Patient Status:  Inpatient    1929 MRN [id number]   [facility] 8NE-A Attending Ros Domínguez MD   [address] Day # 3 PCP Aly Toure DO     Subjective:  Cynthia Kovacs is a(n) 80 year PHOS, PHOSPHORUS     No results for input(s): PT, INR, PTT in the last 168 hours.     Recent Labs   Lab  02/22/19   2346   ABGPHT  7.44   [id number]  38   [id number]  77*   ABGHCO3  25.3   ABGBE  1.1   TEMP  97.6   ALEX  Positive   SITE  Left Radial   DEV  [place] 12/2018. Has been intolerant per report of diuresis in the past at [facility] 12/18 becoming hypotensive w diuresis.   Pneumonia also suspected at the time  · R lung entrapement, iatrogenic PTX  · CAD s/p CABG  · Chronic afib s/p pacemaker, not on anticoagul

## 2019-02-25 NOTE — PROGRESS NOTES
02/25/19 1449   Clinical Encounter Type   Visited With Patient   Referral To Nurse  (Referral made to [employer] for [address] communion as requested. )   Nondenominational Encounters   Spiritual Requests During Visit / Hospitalization [facility]

## 2019-02-25 NOTE — PROGRESS NOTES
[facility]  Progress Note     Jodie Gains Patient Status:  Inpatient    1929 MRN [id number]   [facility] 8NE-A Attending Abisai Clayton MD   [facility] Day # 3 PCP Lynsey Gaspar DO     Chief Complaint: SOB    S: Patient denie Fluticasone Furoate-Vilanterol  1 puff Inhalation Daily   • Memantine HCl  10 mg Oral BID   • Metoprolol Succinate ER  25 mg Oral 2x Daily(Beta Blocker)   • Montelukast Sodium  10 mg Oral Nightly   • primidone  50 mg Oral TID       ASSESSMENT / PLAN:     1

## 2019-02-25 NOTE — PLAN OF CARE
Assumed care for pt at 19:30 on     A&Oxplace. Unable to recall full . Disoriented to time. Pt baseline dementia.  Bed alarm on  Denies pain or SOB  VSS on 2L, SpO2 97%  Lungs clear/diminished  R back bandaid C/D/I  V paced   PO lasix  EF 55-60%  Ofelia

## 2019-02-25 NOTE — CM/SW NOTE
02/25/19 1000   CM/SW Referral Data   Referral Source Physician;Social Work (self-referral)   Reason for Referral Discharge planning   Informant Children  Jorgito Clear)   Pertinent Medical Hx   Primary Care Physician Name [facility] [place]   Patient Info   Patient's

## 2019-02-26 ENCOUNTER — APPOINTMENT (OUTPATIENT)
Dept: GENERAL RADIOLOGY | Facility: HOSPITAL | Age: 84
DRG: 291 | End: 2019-02-26
Attending: INTERNAL MEDICINE
Payer: MEDICARE

## 2019-02-26 LAB — NON GYNE INTERPRETATION: NEGATIVE

## 2019-02-26 PROCEDURE — 99232 SBSQ HOSP IP/OBS MODERATE 35: CPT | Performed by: HOSPITALIST

## 2019-02-26 PROCEDURE — 71045 X-RAY EXAM CHEST 1 VIEW: CPT | Performed by: INTERNAL MEDICINE

## 2019-02-26 RX ORDER — FUROSEMIDE 10 MG/ML
20 INJECTION INTRAMUSCULAR; INTRAVENOUS ONCE
Status: COMPLETED | OUTPATIENT
Start: 2019-02-26 | End: 2019-02-26

## 2019-02-26 NOTE — PLAN OF CARE
Pt denies pain. Pt is A & O x 2-3 w/ forgetfulness. Reorientation completed and continuing to monitor. Pt is V paced on tele, S1 and S2 present and pt denies cardiac symptoms. Lung sounds diminished bilaterally. Abdomen soft and round. Last BM on 2/22/19.

## 2019-02-26 NOTE — PROGRESS NOTES
[facility]  Progress Note    Laura Mane Patient Status:  Inpatient    1929 MRN [id number]   [facility] 8NE-A Attending Michelle Baer MD   [facility] Day # 4 PCP Reinaldo Frazier DO     Subjective:  Laura Mane is a(n) 80 year 13*   BILT   --    --   0.4   TP   --    --   5.7*       Recent Labs   Lab  02/23/19   0515   MG  2.0       No results found for: PHOS, PHOSPHORUS     No results for input(s): PT, INR, PTT in the last 168 hours.     Recent Labs   Lab  02/22/19   8651   ABGP 1 puff 1 puff Inhalation Daily   Memantine HCl (NAMENDA) tab 10 mg 10 mg Oral BID   Metoprolol Succinate ER (Toprol XL) 24 hr tab 25 mg 25 mg Oral 2x Daily(Beta Blocker)   Montelukast Sodium (SINGULAIR) tab 10 mg 10 mg Oral Nightly   primidone (MYSOLINE) t

## 2019-02-26 NOTE — CM/SW NOTE
Care Management/BPCI:    Met with patient at bedside to explain the BPCI/Medicare program. Patient agreed with phone f/u for 3 months from [facility] after discharge from [facility]. Patient was enrolled under           .  BPCI/Medicare Letter a

## 2019-02-26 NOTE — PROGRESS NOTES
[facility]  Progress Note     Rola Ritter Patient Status:  Inpatient    1929 MRN [id number]   [facility] 8NE-A Attending Vaishnavi Bryant MD   [facility] Day # 4 PCP Dallin Quinonez DO     Chief Complaint: SOB    S: happy this mo day   • aspirin  81 mg Oral Daily   • buPROPion HCl ER (XL)  150 mg Oral Daily   • Fluticasone Furoate-Vilanterol  1 puff Inhalation Daily   • Memantine HCl  10 mg Oral BID   • Metoprolol Succinate ER  25 mg Oral 2x Daily(Beta Blocker)   • Montelukast Sodi

## 2019-02-26 NOTE — PROGRESS NOTES
[facility]  Cardiology Progress Note    Juan C Chandler Patient Status:  Inpatient    1929 MRN [id number]   [facility] 8NE-A Attending Regi Riley MD   [facility] Day # 4 PCP Bruna Mccabe DO     Subjective:  Patient states that chest x ray  3. Give IV lasix x 1    GIOVANNA Santana  2/26/2019  11:45 AM    Patient seen and examined independently.  Note reviewed and labs reviewed.  Agree with above assessment and plan. IV lasix X1 today. CXR with mild vascular congestion.     V

## 2019-02-26 NOTE — PROGRESS NOTES
02/26/19 1104   Clinical Encounter Type   Referral To Nurse;Physician  ( scanned signed POLST form, dated 5/2015, into patient's electronic resuscitation wishes/POLST record. )

## 2019-02-26 NOTE — PROGRESS NOTES
Constipation:    Miralax and colace given per pt c/o of constipation and feeling uncomfortable. Pt had moderate formed brown stool without difficulty this am.    Pt states \" I feel much better now\".

## 2019-02-26 NOTE — PLAN OF CARE
Assumed pt care at 11:00, AOx2 forgetufl, 98% on 2L O2, v paced on tele, briefed, no complaints of chest pain or SOB, colace and Mirilax given for constipation, pt had small bowel movement, safety precautions in place; bed in lowest position, call light wi

## 2019-02-27 ENCOUNTER — APPOINTMENT (OUTPATIENT)
Dept: GENERAL RADIOLOGY | Facility: HOSPITAL | Age: 84
DRG: 291 | End: 2019-02-27
Attending: INTERNAL MEDICINE
Payer: MEDICARE

## 2019-02-27 VITALS
RESPIRATION RATE: 18 BRPM | OXYGEN SATURATION: 94 % | BODY MASS INDEX: 21 KG/M2 | TEMPERATURE: 98 F | SYSTOLIC BLOOD PRESSURE: 122 MMHG | HEART RATE: 59 BPM | WEIGHT: 143.31 LBS | DIASTOLIC BLOOD PRESSURE: 44 MMHG

## 2019-02-27 PROCEDURE — 71045 X-RAY EXAM CHEST 1 VIEW: CPT | Performed by: INTERNAL MEDICINE

## 2019-02-27 PROCEDURE — 99239 HOSP IP/OBS DSCHRG MGMT >30: CPT | Performed by: HOSPITALIST

## 2019-02-27 RX ORDER — LISINOPRIL 2.5 MG/1
2.5 TABLET ORAL DAILY
Qty: 30 TABLET | Refills: 1 | Status: ON HOLD | OUTPATIENT
Start: 2019-02-28 | End: 2019-10-29

## 2019-02-27 RX ORDER — FUROSEMIDE 20 MG/1
20 TABLET ORAL DAILY
Qty: 30 TABLET | Refills: 0 | Status: ON HOLD | OUTPATIENT
Start: 2019-02-28 | End: 2019-09-14

## 2019-02-27 NOTE — PROGRESS NOTES
[facility]  Cardiology Progress Note    Nelly Middleburg Patient Status:  Inpatient    1929 MRN [id number]   [facility] 8NE-A Attending Honey Beltran [facility] Day # 5 PCP Reuben Mcrae DO     Subjective:  Sitting up in IV lasix, O2 weaned  · CAD s/p CABG no angina, LVEF 55-60%  · AFib parosxysmal currently AFib, not on AC due to h/o GIB  · HTN controlled  · PM [employer]    Plan:   · Continue PO lasix, ACE, ASA, BB  · Ok to discharge to [facility] from a cardiac perspective

## 2019-02-27 NOTE — PROGRESS NOTES
[facility]  Progress Note    Jevon Ernandez Patient Status:  Inpatient    1929 MRN [id number]   [facility] 8NE-A Attending Joey Rocha MD   [facility] Day # 5 PCP Janet Zabala DO     Subjective:  Jevon Ernandez is a(n) 80 year 13*   BILT   --    --   0.4   TP   --    --   5.7*       Recent Labs   Lab  02/23/19   0515   MG  2.0       No results found for: PHOS, PHOSPHORUS     No results for input(s): PT, INR, PTT in the last 168 hours.     Recent Labs   Lab  02/22/19   3157   ABGP 81 mg Oral Daily   buPROPion HCl ER (XL) (WELLBUTRIN XL) 150 MG 24 hr tab 150 mg 150 mg Oral Daily   Fluticasone Furoate-Vilanterol (BREO ELLIPTA) 100-25 MCG/INH inhaler 1 puff 1 puff Inhalation Daily   Memantine HCl (NAMENDA) tab 10 mg 10 mg Oral BID   Me

## 2019-02-27 NOTE — PROGRESS NOTES
IV and tele discontinued. Pt. And daughter, Chelo Moore, received discharge instructions and follow-up information. Paper copies of prescriptions given to daughter. Homecare notified of discharge per social work. Questions addressed and answered. Pt.  To be joaquin

## 2019-02-27 NOTE — PROGRESS NOTES
[facility]  Progress Note     Lena Clemente Patient Status:  Inpatient    1929 MRN [id number]   [facility] 8NE-A Attending [facility] Day # 5 PCP Beckie Alvarado DO     Chief Complaint: SOB    S: Prasanth Borrego 2.5 mg Oral Daily   • Heparin Sodium (Porcine)  5,000 Units Subcutaneous 2 times per day   • aspirin  81 mg Oral Daily   • buPROPion HCl ER (XL)  150 mg Oral Daily   • Fluticasone Furoate-Vilanterol  1 puff Inhalation Daily   • Memantine HCl  10 mg Oral BI

## 2019-02-27 NOTE — PLAN OF CARE
Received bedside report on this Pt., at 1650. Pt. Awake, calm, pleasant and cooperative, A&O, forgetful at times. Pt. Is V-Paced on Tele monitor, sats greater than 92% on 2 L Oxygen per NC, denies any pain or distress.  Pt. Up to BR with walker and assist

## 2019-02-27 NOTE — PLAN OF CARE
Patient/Family Goals    • Patient/Family Long Term Goal Progressing    • Patient/Family Short Term Goal Progressing        RESPIRATORY - ADULT    • Achieves optimal ventilation and oxygenation Progressing          Pt. Alert, calm, cooperative, forgetful.  V

## 2019-03-01 PROBLEM — G30.1 LATE ONSET ALZHEIMER'S DISEASE WITHOUT BEHAVIORAL DISTURBANCE (HCC): Chronic | Status: ACTIVE | Noted: 2019-03-01

## 2019-03-01 PROBLEM — J95.811 POSTPROCEDURAL PNEUMOTHORAX: Status: ACTIVE | Noted: 2019-03-01

## 2019-03-01 PROBLEM — I25.10 CORONARY ARTERY DISEASE INVOLVING NATIVE HEART WITHOUT ANGINA PECTORIS: Chronic | Status: ACTIVE | Noted: 2019-03-01

## 2019-03-01 PROBLEM — F02.80 LATE ONSET ALZHEIMER'S DISEASE WITHOUT BEHAVIORAL DISTURBANCE (HCC): Chronic | Status: ACTIVE | Noted: 2019-03-01

## 2019-03-01 NOTE — DISCHARGE SUMMARY
[facility] [place] [facility]  DISCHARGE SUMMARY     Trude Dies Patient Status:  Inpatient    1929 MRN [id number]   [facility] 8NE-A Attending No att. providers found   Hosp Day # 5 PCP Marcela Lala DO     Date of Admission: 2019  D held in case patient needed to go for chest tube placement. Pneumothorax continue to improve so the placement of a right chest tube was held off. Analysis of the pleuritic fluid was consistent with transudate. Echo echocardiogram showed EF of 30-35%.   P Inhale 1 puff into the lungs every 12 (twelve) hours. Refills:  0     Memantine HCl 10 MG Tabs  Commonly known as:  NAMENDA      Take 10 mg by mouth 2 (two) times daily. Refills:  0     Metoprolol Succinate ER 25 MG Tb24  Commonly known as:   Toprol XL such as watches, jewelry, hairpins, eyeglasses and hearing aids. Your purse, wallet or other personal items will remain in a secure locker or with your family during your exam.    Please bring your insurance card and photo ID.  You will also need to bring y for your services at the time of check in.  This new process will inform you if you have any remaining deductible or coinsurance balances on your policy.  Please be aware that this service is performed for most of our commonly performed services and that y

## 2019-03-02 ENCOUNTER — HOSPITAL ENCOUNTER (OUTPATIENT)
Dept: GENERAL RADIOLOGY | Facility: HOSPITAL | Age: 84
Discharge: HOME | End: 2019-03-02
Attending: INTERNAL MEDICINE
Payer: MEDICARE

## 2019-03-02 DIAGNOSIS — J93.9 PNEUMOTHORAX ON RIGHT: ICD-10-CM

## 2019-03-02 DIAGNOSIS — J90 PLEURAL EFFUSION: ICD-10-CM

## 2019-03-02 PROCEDURE — 71045 X-RAY EXAM CHEST 1 VIEW: CPT | Performed by: INTERNAL MEDICINE

## 2019-03-13 ENCOUNTER — HOSPITAL ENCOUNTER (OUTPATIENT)
Dept: ULTRASOUND IMAGING | Facility: HOSPITAL | Age: 84
Discharge: HOME | End: 2019-03-13
Attending: INTERNAL MEDICINE
Payer: MEDICARE

## 2019-03-13 ENCOUNTER — HOSPITAL ENCOUNTER (OUTPATIENT)
Dept: GENERAL RADIOLOGY | Facility: HOSPITAL | Age: 84
Discharge: HOME | End: 2019-03-13
Attending: INTERNAL MEDICINE
Payer: MEDICARE

## 2019-03-13 DIAGNOSIS — J90 PLEURAL EFFUSION: ICD-10-CM

## 2019-03-13 LAB
ALBUMIN FLD-MCNC: 1.7 G/DL
BASOPHIL PLEURAL FLUID: 0 %
CHOLEST PLR-MCNC: 65 MG/DL
EOSINOPHIL PLEURAL FLUID: 9 %
GLUCOSE PLR-MCNC: 109 MG/DL
HEMATOCRIT PLEURAL FLUID: <5 %
LDH FLD L TO P-CCNC: 94 U/L
LYMPHOCYTE PLEURAL FLUID: 82 %
MONO/MAC/HISTIO PLEURAL FLUID: 9 %
NEUTROPHIL PLEURAL FLUID: 0 %
PLEURAL FLUID PH: 7.46 (ref 7.2–7.5)
PROT PLR-MCNC: 3 G/DL
RBC PLEURAL FLUID: <3000 /MM3
TOTAL CELLS COUNTED: 100
TRIGL PLR-MCNC: 12 MG/DL
WBC PLEURAL FLUID: 383 /MM3

## 2019-03-13 PROCEDURE — 84157 ASSAY OF PROTEIN OTHER: CPT | Performed by: INTERNAL MEDICINE

## 2019-03-13 PROCEDURE — 82800 BLOOD PH: CPT | Performed by: INTERNAL MEDICINE

## 2019-03-13 PROCEDURE — 85014 HEMATOCRIT: CPT | Performed by: INTERNAL MEDICINE

## 2019-03-13 PROCEDURE — 82465 ASSAY BLD/SERUM CHOLESTEROL: CPT | Performed by: INTERNAL MEDICINE

## 2019-03-13 PROCEDURE — 84311 SPECTROPHOTOMETRY: CPT | Performed by: INTERNAL MEDICINE

## 2019-03-13 PROCEDURE — 87205 SMEAR GRAM STAIN: CPT | Performed by: INTERNAL MEDICINE

## 2019-03-13 PROCEDURE — 88305 TISSUE EXAM BY PATHOLOGIST: CPT | Performed by: INTERNAL MEDICINE

## 2019-03-13 PROCEDURE — 89051 BODY FLUID CELL COUNT: CPT | Performed by: INTERNAL MEDICINE

## 2019-03-13 PROCEDURE — 87116 MYCOBACTERIA CULTURE: CPT | Performed by: INTERNAL MEDICINE

## 2019-03-13 PROCEDURE — 87206 SMEAR FLUORESCENT/ACID STAI: CPT | Performed by: INTERNAL MEDICINE

## 2019-03-13 PROCEDURE — 71045 X-RAY EXAM CHEST 1 VIEW: CPT | Performed by: INTERNAL MEDICINE

## 2019-03-13 PROCEDURE — 88108 CYTOPATH CONCENTRATE TECH: CPT | Performed by: INTERNAL MEDICINE

## 2019-03-13 PROCEDURE — 82945 GLUCOSE OTHER FLUID: CPT | Performed by: INTERNAL MEDICINE

## 2019-03-13 PROCEDURE — 84478 ASSAY OF TRIGLYCERIDES: CPT | Performed by: INTERNAL MEDICINE

## 2019-03-13 PROCEDURE — 87070 CULTURE OTHR SPECIMN AEROBIC: CPT | Performed by: INTERNAL MEDICINE

## 2019-03-13 PROCEDURE — 89050 BODY FLUID CELL COUNT: CPT | Performed by: INTERNAL MEDICINE

## 2019-03-13 PROCEDURE — 87102 FUNGUS ISOLATION CULTURE: CPT | Performed by: INTERNAL MEDICINE

## 2019-03-13 PROCEDURE — 32555 ASPIRATE PLEURA W/ IMAGING: CPT | Performed by: INTERNAL MEDICINE

## 2019-03-13 PROCEDURE — 82042 OTHER SOURCE ALBUMIN QUAN EA: CPT | Performed by: INTERNAL MEDICINE

## 2019-03-13 PROCEDURE — 83615 LACTATE (LD) (LDH) ENZYME: CPT | Performed by: INTERNAL MEDICINE

## 2019-03-14 PROBLEM — G30.1 LATE ONSET ALZHEIMER'S DISEASE WITHOUT BEHAVIORAL DISTURBANCE (CMD): Status: ACTIVE | Noted: 2019-01-01

## 2019-03-14 PROBLEM — F02.80 LATE ONSET ALZHEIMER'S DISEASE WITHOUT BEHAVIORAL DISTURBANCE (CMD): Status: ACTIVE | Noted: 2019-01-01

## 2019-03-14 PROBLEM — I50.21: Status: ACTIVE | Noted: 2019-01-01

## 2019-03-14 PROBLEM — J90 PLEURAL EFFUSION: Status: ACTIVE | Noted: 2019-01-01

## 2019-03-14 PROBLEM — J95.811 POSTPROCEDURAL PNEUMOTHORAX: Status: ACTIVE | Noted: 2019-01-01

## 2019-03-14 LAB — NON GYNE INTERPRETATION: NEGATIVE

## 2019-03-16 ENCOUNTER — LAB ENCOUNTER (OUTPATIENT)
Dept: LAB | Age: 84
End: 2019-03-16
Attending: NURSE PRACTITIONER
Payer: MEDICARE

## 2019-03-16 DIAGNOSIS — I50.21 HEART FAILURE, SYSTOLIC, ACUTE (HCC): Primary | ICD-10-CM

## 2019-03-16 DIAGNOSIS — J90 EXUDATIVE PLEURISY: ICD-10-CM

## 2019-03-16 LAB
ADENOSINE DEAMINASE, PLEURAL FLUID: <1.6 U/L
ANION GAP SERPL CALC-SCNC: 6 MMOL/L (ref 0–18)
BUN BLD-MCNC: 18 MG/DL (ref 7–18)
BUN/CREAT SERPL: 19.4 (ref 10–20)
CALCIUM BLD-MCNC: 9 MG/DL (ref 8.5–10.1)
CHLORIDE SERPL-SCNC: 104 MMOL/L (ref 98–107)
CO2 SERPL-SCNC: 30 MMOL/L (ref 21–32)
CREAT BLD-MCNC: 0.93 MG/DL (ref 0.7–1.3)
GLUCOSE BLD-MCNC: 100 MG/DL (ref 70–99)
OSMOLALITY SERPL CALC.SUM OF ELEC: 292 MOSM/KG (ref 275–295)
POTASSIUM SERPL-SCNC: 4.6 MMOL/L (ref 3.5–5.1)
SODIUM SERPL-SCNC: 140 MMOL/L (ref 136–145)

## 2019-03-16 PROCEDURE — 80048 BASIC METABOLIC PNL TOTAL CA: CPT

## 2019-03-16 PROCEDURE — 36415 COLL VENOUS BLD VENIPUNCTURE: CPT

## 2019-04-06 ENCOUNTER — HOSPITAL ENCOUNTER (OUTPATIENT)
Dept: LAB | Facility: HOSPITAL | Age: 84
Discharge: HOME | End: 2019-04-06
Attending: INTERNAL MEDICINE
Payer: MEDICARE

## 2019-04-06 DIAGNOSIS — I25.10 CORONARY ARTERY DISEASE INVOLVING NATIVE HEART WITHOUT ANGINA PECTORIS, UNSPECIFIED VESSEL OR LESION TYPE: ICD-10-CM

## 2019-04-06 DIAGNOSIS — J90 PLEURAL EFFUSION: ICD-10-CM

## 2019-04-06 PROCEDURE — 36415 COLL VENOUS BLD VENIPUNCTURE: CPT | Performed by: INTERNAL MEDICINE

## 2019-04-06 PROCEDURE — 80048 BASIC METABOLIC PNL TOTAL CA: CPT | Performed by: INTERNAL MEDICINE

## 2019-06-15 ENCOUNTER — HOSPITAL ENCOUNTER (OUTPATIENT)
Dept: CT IMAGING | Facility: HOSPITAL | Age: 84
Discharge: HOME | End: 2019-06-15
Attending: INTERNAL MEDICINE
Payer: MEDICARE

## 2019-06-15 DIAGNOSIS — R59.0 MEDIASTINAL ADENOPATHY: ICD-10-CM

## 2019-06-15 DIAGNOSIS — J90 PLEURAL EFFUSION, RIGHT: ICD-10-CM

## 2019-06-15 DIAGNOSIS — R91.1 SOLITARY LUNG NODULE: ICD-10-CM

## 2019-06-15 PROCEDURE — 82565 ASSAY OF CREATININE: CPT

## 2019-06-15 PROCEDURE — 71260 CT THORAX DX C+: CPT | Performed by: INTERNAL MEDICINE

## 2019-09-05 ENCOUNTER — APPOINTMENT (OUTPATIENT)
Dept: GENERAL RADIOLOGY | Age: 84
DRG: 871 | End: 2019-09-05
Attending: FAMILY MEDICINE
Payer: MEDICARE

## 2019-09-05 ENCOUNTER — HOSPITAL ENCOUNTER (INPATIENT)
Facility: HOSPITAL | Age: 84
LOS: 8 days | Discharge: NURSING, REHAB OR LONG-TERM CARE | DRG: 871 | End: 2019-09-14
Attending: EMERGENCY MEDICINE | Admitting: HOSPITALIST
Payer: MEDICARE

## 2019-09-05 ENCOUNTER — HOSPITAL ENCOUNTER (EMERGENCY)
Facility: HOSPITAL | Age: 84
Discharge: STILL ADMITTED OR ADMINISTRATIVE | End: 2019-09-05
Payer: MEDICARE

## 2019-09-05 ENCOUNTER — HOSPITAL ENCOUNTER (OUTPATIENT)
Age: 84
Discharge: HOME | DRG: 871 | End: 2019-09-05
Attending: FAMILY MEDICINE
Payer: MEDICARE

## 2019-09-05 VITALS
TEMPERATURE: 100 F | SYSTOLIC BLOOD PRESSURE: 152 MMHG | RESPIRATION RATE: 20 BRPM | HEART RATE: 60 BPM | OXYGEN SATURATION: 96 % | DIASTOLIC BLOOD PRESSURE: 76 MMHG

## 2019-09-05 DIAGNOSIS — R09.02 HYPOXIA: ICD-10-CM

## 2019-09-05 DIAGNOSIS — J18.9 PNEUMONIA DUE TO INFECTIOUS ORGANISM, UNSPECIFIED LATERALITY, UNSPECIFIED PART OF LUNG: ICD-10-CM

## 2019-09-05 DIAGNOSIS — J20.9 COPD WITH ACUTE BRONCHITIS (HCC): ICD-10-CM

## 2019-09-05 DIAGNOSIS — J18.0 BRONCHOPNEUMONIA, UNSPECIFIED ORGANISM: Primary | ICD-10-CM

## 2019-09-05 DIAGNOSIS — J44.0 COPD WITH ACUTE BRONCHITIS (HCC): ICD-10-CM

## 2019-09-05 DIAGNOSIS — R77.8 ELEVATED TROPONIN: Primary | ICD-10-CM

## 2019-09-05 DIAGNOSIS — I50.9 ACUTE ON CHRONIC CONGESTIVE HEART FAILURE, UNSPECIFIED HEART FAILURE TYPE (HCC): ICD-10-CM

## 2019-09-05 DIAGNOSIS — R53.83 OTHER FATIGUE: ICD-10-CM

## 2019-09-05 LAB
ALBUMIN SERPL-MCNC: 3.1 G/DL (ref 3.4–5)
ALBUMIN/GLOB SERPL: 0.8 {RATIO} (ref 1–2)
ALP LIVER SERPL-CCNC: 78 U/L (ref 45–117)
ALT SERPL-CCNC: 25 U/L (ref 16–61)
ANION GAP SERPL CALC-SCNC: 7 MMOL/L (ref 0–18)
APTT PPP: 31.5 SECONDS (ref 25.4–36.1)
AST SERPL-CCNC: 30 U/L (ref 15–37)
BASOPHILS # BLD AUTO: 0.01 X10(3) UL (ref 0–0.2)
BASOPHILS NFR BLD AUTO: 0.1 %
BILIRUB SERPL-MCNC: 0.5 MG/DL (ref 0.1–2)
BILIRUB UR QL STRIP.AUTO: NEGATIVE
BUN BLD-MCNC: 26 MG/DL (ref 7–18)
BUN/CREAT SERPL: 19.4 (ref 10–20)
CALCIUM BLD-MCNC: 8.6 MG/DL (ref 8.5–10.1)
CHLORIDE SERPL-SCNC: 105 MMOL/L (ref 98–112)
CLARITY UR REFRACT.AUTO: CLEAR
CO2 SERPL-SCNC: 26 MMOL/L (ref 21–32)
COLOR UR AUTO: YELLOW
CREAT BLD-MCNC: 1.34 MG/DL (ref 0.7–1.3)
DEPRECATED RDW RBC AUTO: 49.7 FL (ref 35.1–46.3)
EOSINOPHIL # BLD AUTO: 0 X10(3) UL (ref 0–0.7)
EOSINOPHIL NFR BLD AUTO: 0 %
ERYTHROCYTE [DISTWIDTH] IN BLOOD BY AUTOMATED COUNT: 15.6 % (ref 11–15)
GLOBULIN PLAS-MCNC: 3.8 G/DL (ref 2.8–4.4)
GLUCOSE BLD-MCNC: 127 MG/DL (ref 70–99)
GLUCOSE UR STRIP.AUTO-MCNC: NEGATIVE MG/DL
HCT VFR BLD AUTO: 34.4 % (ref 39–53)
HGB BLD-MCNC: 12.1 G/DL (ref 13–17.5)
IMM GRANULOCYTES # BLD AUTO: 0.02 X10(3) UL (ref 0–1)
IMM GRANULOCYTES NFR BLD: 0.2 %
INR BLD: 1.24 (ref 0.9–1.1)
KETONES UR STRIP.AUTO-MCNC: NEGATIVE MG/DL
LACTATE SERPL-SCNC: 1.2 MMOL/L (ref 0.4–2)
LEUKOCYTE ESTERASE UR QL STRIP.AUTO: NEGATIVE
LYMPHOCYTES # BLD AUTO: 0.34 X10(3) UL (ref 1–4)
LYMPHOCYTES NFR BLD AUTO: 3.9 %
M PROTEIN MFR SERPL ELPH: 6.9 G/DL (ref 6.4–8.2)
MCH RBC QN AUTO: 35.6 PG (ref 26–34)
MCHC RBC AUTO-ENTMCNC: 35.2 G/DL (ref 31–37)
MCV RBC AUTO: 101.2 FL (ref 80–100)
MONOCYTES # BLD AUTO: 0.6 X10(3) UL (ref 0.1–1)
MONOCYTES NFR BLD AUTO: 7 %
NEUTROPHILS # BLD AUTO: 7.64 X10 (3) UL (ref 1.5–7.7)
NEUTROPHILS # BLD AUTO: 7.64 X10(3) UL (ref 1.5–7.7)
NEUTROPHILS NFR BLD AUTO: 88.8 %
NITRITE UR QL STRIP.AUTO: NEGATIVE
NT-PROBNP SERPL-MCNC: 7859 PG/ML (ref ?–450)
OSMOLALITY SERPL CALC.SUM OF ELEC: 292 MOSM/KG (ref 275–295)
PH UR STRIP.AUTO: 5 [PH] (ref 4.5–8)
PLATELET # BLD AUTO: 136 10(3)UL (ref 150–450)
POTASSIUM SERPL-SCNC: 3.9 MMOL/L (ref 3.5–5.1)
PROCALCITONIN SERPL-MCNC: 0.61 NG/ML
PROT UR STRIP.AUTO-MCNC: NEGATIVE MG/DL
PSA SERPL DL<=0.01 NG/ML-MCNC: 16.2 SECONDS (ref 12.5–14.7)
RBC # BLD AUTO: 3.4 X10(6)UL (ref 3.8–5.8)
RBC UR QL AUTO: NEGATIVE
SODIUM SERPL-SCNC: 138 MMOL/L (ref 136–145)
SP GR UR STRIP.AUTO: 1.01 (ref 1–1.03)
TROPONIN I SERPL-MCNC: 0.05 NG/ML (ref ?–0.04)
UROBILINOGEN UR STRIP.AUTO-MCNC: <2 MG/DL
WBC # BLD AUTO: 8.6 X10(3) UL (ref 4–11)

## 2019-09-05 PROCEDURE — 99213 OFFICE O/P EST LOW 20 MIN: CPT

## 2019-09-05 PROCEDURE — 99223 1ST HOSP IP/OBS HIGH 75: CPT | Performed by: HOSPITALIST

## 2019-09-05 PROCEDURE — 71046 X-RAY EXAM CHEST 2 VIEWS: CPT | Performed by: FAMILY MEDICINE

## 2019-09-05 PROCEDURE — 3E033XZ INTRODUCTION OF VASOPRESSOR INTO PERIPHERAL VEIN, PERCUTANEOUS APPROACH: ICD-10-PCS | Performed by: INTERNAL MEDICINE

## 2019-09-05 RX ORDER — SODIUM CHLORIDE 9 MG/ML
INJECTION, SOLUTION INTRAVENOUS CONTINUOUS
Status: DISCONTINUED | OUTPATIENT
Start: 2019-09-05 | End: 2019-09-06

## 2019-09-05 RX ORDER — ASPIRIN 81 MG/1
324 TABLET, CHEWABLE ORAL ONCE
Status: COMPLETED | OUTPATIENT
Start: 2019-09-05 | End: 2019-09-05

## 2019-09-05 RX ORDER — ACETAMINOPHEN 325 MG/1
650 TABLET ORAL ONCE
Status: COMPLETED | OUTPATIENT
Start: 2019-09-05 | End: 2019-09-05

## 2019-09-06 ENCOUNTER — APPOINTMENT (OUTPATIENT)
Dept: GENERAL RADIOLOGY | Facility: HOSPITAL | Age: 84
DRG: 871 | End: 2019-09-06
Attending: INTERNAL MEDICINE
Payer: MEDICARE

## 2019-09-06 PROBLEM — J18.9 PNEUMONIA DUE TO INFECTIOUS ORGANISM, UNSPECIFIED LATERALITY, UNSPECIFIED PART OF LUNG: Status: ACTIVE | Noted: 2019-09-06

## 2019-09-06 PROBLEM — R09.02 HYPOXIA: Status: ACTIVE | Noted: 2019-09-06

## 2019-09-06 PROBLEM — I50.9 ACUTE ON CHRONIC CONGESTIVE HEART FAILURE, UNSPECIFIED HEART FAILURE TYPE (HCC): Status: ACTIVE | Noted: 2019-09-06

## 2019-09-06 LAB
ADENOVIRUS PCR:: NEGATIVE
ALLENS TEST: POSITIVE
ALLENS TEST: POSITIVE
ANION GAP SERPL CALC-SCNC: 8 MMOL/L (ref 0–18)
ARTERIAL BLD GAS O2 SATURATION: 96 % (ref 92–100)
ARTERIAL BLD GAS O2 SATURATION: 97 % (ref 92–100)
ARTERIAL BLOOD GAS BASE EXCESS: -5.4 MMOL/L (ref ?–2)
ARTERIAL BLOOD GAS BASE EXCESS: -9 MMOL/L (ref ?–2)
ARTERIAL BLOOD GAS HCO3: 15.1 MEQ/L (ref 22–26)
ARTERIAL BLOOD GAS HCO3: 18.5 MEQ/L (ref 22–26)
ARTERIAL BLOOD GAS PCO2: 28 MM HG (ref 35–45)
ARTERIAL BLOOD GAS PCO2: 31 MM HG (ref 35–45)
ARTERIAL BLOOD GAS PH: 7.35 (ref 7.35–7.45)
ARTERIAL BLOOD GAS PH: 7.4 (ref 7.35–7.45)
ARTERIAL BLOOD GAS PO2: 102 MM HG (ref 80–105)
ARTERIAL BLOOD GAS PO2: 124 MM HG (ref 80–105)
ATRIAL RATE: 416 BPM
B PERT DNA SPEC QL NAA+PROBE: NEGATIVE
BASOPHILS # BLD AUTO: 0.02 X10(3) UL (ref 0–0.2)
BASOPHILS NFR BLD AUTO: 0.3 %
BUN BLD-MCNC: 25 MG/DL (ref 7–18)
BUN/CREAT SERPL: 23.1 (ref 10–20)
C PNEUM DNA SPEC QL NAA+PROBE: NEGATIVE
CALCIUM BLD-MCNC: 7.5 MG/DL (ref 8.5–10.1)
CALCULATED O2 SATURATION: 98 % (ref 92–100)
CALCULATED O2 SATURATION: 98 % (ref 92–100)
CARBOXYHEMOGLOBIN: 1.2 % SAT (ref 0–3)
CARBOXYHEMOGLOBIN: 1.5 % SAT (ref 0–3)
CHLORIDE SERPL-SCNC: 110 MMOL/L (ref 98–112)
CO2 SERPL-SCNC: 23 MMOL/L (ref 21–32)
CORONAVIRUS 229E PCR:: NEGATIVE
CORONAVIRUS HKU1 PCR:: NEGATIVE
CORONAVIRUS NL63 PCR:: NEGATIVE
CORONAVIRUS OC43 PCR:: NEGATIVE
CREAT BLD-MCNC: 1.08 MG/DL (ref 0.7–1.3)
DEPRECATED RDW RBC AUTO: 50.4 FL (ref 35.1–46.3)
EOSINOPHIL # BLD AUTO: 0.05 X10(3) UL (ref 0–0.7)
EOSINOPHIL NFR BLD AUTO: 0.8 %
ERYTHROCYTE [DISTWIDTH] IN BLOOD BY AUTOMATED COUNT: 13.9 % (ref 11–15)
EXPIRATORY PRESSURE: 6 CM H2O
FIO2: 60 %
FLUAV RNA SPEC QL NAA+PROBE: NEGATIVE
FLUBV RNA SPEC QL NAA+PROBE: NEGATIVE
GLUCOSE BLD-MCNC: 105 MG/DL (ref 70–99)
HCT VFR BLD AUTO: 35.5 % (ref 39–53)
HGB BLD-MCNC: 11.2 G/DL (ref 13–17.5)
IMM GRANULOCYTES # BLD AUTO: 0.02 X10(3) UL (ref 0–1)
IMM GRANULOCYTES NFR BLD: 0.3 %
INSP PRESSURE: 12 CM H2O
IONIZED CALCIUM: 1.11 MMOL/L (ref 1.12–1.32)
L/M: 6 L/MIN
LACTIC ACID ARTERIAL: <1.6 MMOL/L (ref 0.5–2)
LYMPHOCYTES # BLD AUTO: 0.33 X10(3) UL (ref 1–4)
LYMPHOCYTES NFR BLD AUTO: 5.3 %
MCH RBC QN AUTO: 31.7 PG (ref 26–34)
MCHC RBC AUTO-ENTMCNC: 31.5 G/DL (ref 31–37)
MCV RBC AUTO: 100.6 FL (ref 80–100)
METAPNEUMOVIRUS PCR:: NEGATIVE
METHEMOGLOBIN: 0.5 % SAT (ref 0.4–1.5)
METHEMOGLOBIN: 0.5 % SAT (ref 0.4–1.5)
MONOCYTES # BLD AUTO: 0.67 X10(3) UL (ref 0.1–1)
MONOCYTES NFR BLD AUTO: 10.7 %
MYCOPLASMA PNEUMONIA PCR:: NEGATIVE
NEUTROPHILS # BLD AUTO: 5.19 X10 (3) UL (ref 1.5–7.7)
NEUTROPHILS # BLD AUTO: 5.19 X10(3) UL (ref 1.5–7.7)
NEUTROPHILS NFR BLD AUTO: 82.6 %
OSMOLALITY SERPL CALC.SUM OF ELEC: 297 MOSM/KG (ref 275–295)
P-R INTERVAL: 154 MS
PARAINFLUENZA 1 PCR:: NEGATIVE
PARAINFLUENZA 2 PCR:: NEGATIVE
PARAINFLUENZA 3 PCR:: NEGATIVE
PARAINFLUENZA 4 PCR:: NEGATIVE
PATIENT TEMPERATURE: 98 F
PATIENT TEMPERATURE: 98.3 F
PLATELET # BLD AUTO: 114 10(3)UL (ref 150–450)
POTASSIUM BLOOD GAS: 3.8 MMOL/L (ref 3.6–5.1)
POTASSIUM SERPL-SCNC: 3.6 MMOL/L (ref 3.5–5.1)
Q-T INTERVAL: 522 MS
QRS DURATION: 168 MS
QTC CALCULATION (BEZET): 522 MS
R AXIS: -71 DEGREES
RBC # BLD AUTO: 3.53 X10(6)UL (ref 3.8–5.8)
RHINOVIRUS/ENTERO PCR:: POSITIVE
RSV RNA SPEC QL NAA+PROBE: NEGATIVE
SODIUM BLOOD GAS: 140 MMOL/L (ref 136–144)
SODIUM SERPL-SCNC: 141 MMOL/L (ref 136–145)
T AXIS: 81 DEGREES
TOTAL HEMOGLOBIN: 12.5 G/DL (ref 12.6–17.4)
TOTAL HEMOGLOBIN: 12.9 G/DL (ref 12.6–17.4)
TROPONIN I SERPL-MCNC: 0.07 NG/ML (ref ?–0.04)
TROPONIN I SERPL-MCNC: <0.045 NG/ML (ref ?–0.04)
VENTRICULAR RATE: 60 BPM
WBC # BLD AUTO: 6.3 X10(3) UL (ref 4–11)

## 2019-09-06 PROCEDURE — 02HV33Z INSERTION OF INFUSION DEVICE INTO SUPERIOR VENA CAVA, PERCUTANEOUS APPROACH: ICD-10-PCS | Performed by: INTERNAL MEDICINE

## 2019-09-06 PROCEDURE — 71045 X-RAY EXAM CHEST 1 VIEW: CPT | Performed by: INTERNAL MEDICINE

## 2019-09-06 PROCEDURE — 99233 SBSQ HOSP IP/OBS HIGH 50: CPT | Performed by: INTERNAL MEDICINE

## 2019-09-06 PROCEDURE — 99291 CRITICAL CARE FIRST HOUR: CPT | Performed by: NURSE PRACTITIONER

## 2019-09-06 PROCEDURE — B548ZZA ULTRASONOGRAPHY OF SUPERIOR VENA CAVA, GUIDANCE: ICD-10-PCS | Performed by: INTERNAL MEDICINE

## 2019-09-06 PROCEDURE — 5A09457 ASSISTANCE WITH RESPIRATORY VENTILATION, 24-96 CONSECUTIVE HOURS, CONTINUOUS POSITIVE AIRWAY PRESSURE: ICD-10-PCS | Performed by: INTERNAL MEDICINE

## 2019-09-06 RX ORDER — ONDANSETRON 2 MG/ML
4 INJECTION INTRAMUSCULAR; INTRAVENOUS EVERY 6 HOURS PRN
Status: DISCONTINUED | OUTPATIENT
Start: 2019-09-06 | End: 2019-09-14

## 2019-09-06 RX ORDER — ENOXAPARIN SODIUM 100 MG/ML
40 INJECTION SUBCUTANEOUS DAILY
Status: DISCONTINUED | OUTPATIENT
Start: 2019-09-06 | End: 2019-09-14

## 2019-09-06 RX ORDER — MEMANTINE HYDROCHLORIDE 10 MG/1
10 TABLET ORAL 2 TIMES DAILY
Status: DISCONTINUED | OUTPATIENT
Start: 2019-09-06 | End: 2019-09-14

## 2019-09-06 RX ORDER — MONTELUKAST SODIUM 10 MG/1
10 TABLET ORAL NIGHTLY
Status: DISCONTINUED | OUTPATIENT
Start: 2019-09-06 | End: 2019-09-14

## 2019-09-06 RX ORDER — IPRATROPIUM BROMIDE AND ALBUTEROL SULFATE 2.5; .5 MG/3ML; MG/3ML
3 SOLUTION RESPIRATORY (INHALATION)
Status: DISCONTINUED | OUTPATIENT
Start: 2019-09-06 | End: 2019-09-06

## 2019-09-06 RX ORDER — SODIUM CHLORIDE 0.9 % (FLUSH) 0.9 %
10 SYRINGE (ML) INJECTION AS NEEDED
Status: DISCONTINUED | OUTPATIENT
Start: 2019-09-06 | End: 2019-09-14

## 2019-09-06 RX ORDER — ASPIRIN 81 MG/1
81 TABLET, CHEWABLE ORAL DAILY
Status: DISCONTINUED | OUTPATIENT
Start: 2019-09-06 | End: 2019-09-14

## 2019-09-06 RX ORDER — IPRATROPIUM BROMIDE AND ALBUTEROL SULFATE 2.5; .5 MG/3ML; MG/3ML
3 SOLUTION RESPIRATORY (INHALATION) EVERY 6 HOURS PRN
Status: DISCONTINUED | OUTPATIENT
Start: 2019-09-06 | End: 2019-09-14

## 2019-09-06 RX ORDER — IPRATROPIUM BROMIDE AND ALBUTEROL SULFATE 2.5; .5 MG/3ML; MG/3ML
3 SOLUTION RESPIRATORY (INHALATION)
Status: DISCONTINUED | OUTPATIENT
Start: 2019-09-06 | End: 2019-09-14

## 2019-09-06 RX ORDER — ACETAMINOPHEN 325 MG/1
650 TABLET ORAL EVERY 6 HOURS PRN
Status: DISCONTINUED | OUTPATIENT
Start: 2019-09-06 | End: 2019-09-14

## 2019-09-06 RX ORDER — PHENTOLAMINE MESYLATE 5 MG/ML
10 INJECTION, POWDER, FOR SOLUTION INTRAMUSCULAR; INTRAVENOUS
Status: COMPLETED | OUTPATIENT
Start: 2019-09-06 | End: 2019-09-06

## 2019-09-06 RX ORDER — METOCLOPRAMIDE HYDROCHLORIDE 5 MG/ML
5 INJECTION INTRAMUSCULAR; INTRAVENOUS EVERY 8 HOURS PRN
Status: DISCONTINUED | OUTPATIENT
Start: 2019-09-06 | End: 2019-09-14

## 2019-09-06 RX ORDER — SODIUM CHLORIDE, SODIUM LACTATE, POTASSIUM CHLORIDE, CALCIUM CHLORIDE 600; 310; 30; 20 MG/100ML; MG/100ML; MG/100ML; MG/100ML
INJECTION, SOLUTION INTRAVENOUS ONCE
Status: DISCONTINUED | OUTPATIENT
Start: 2019-09-06 | End: 2019-09-06

## 2019-09-06 RX ORDER — PRIMIDONE 50 MG/1
50 TABLET ORAL 3 TIMES DAILY
Status: DISCONTINUED | OUTPATIENT
Start: 2019-09-06 | End: 2019-09-14

## 2019-09-06 RX ORDER — ESCITALOPRAM OXALATE 10 MG/1
10 TABLET ORAL DAILY
Status: DISCONTINUED | OUTPATIENT
Start: 2019-09-06 | End: 2019-09-14

## 2019-09-06 NOTE — PROGRESS NOTES
ICU  Critical Care APRN Progress Note    NAME: Zita Yu - ROOM: [address] [place] [address] - MRN: [id number] - Age: 80year old - :1929    History Of Present Illness:  Zita Yu is a 80year old male with PMHx significant for CHF, CAD, PAF, PPM, GIB, isc HPI.    OBJECTIVE  Vitals:  BP 98/41   Pulse 60   Temp 99.3 °F (37.4 °C) (Oral)   Resp 17   Ht 177.8 cm (5' 10\")   Wt 150 lb (68 kg)   SpO2 97%   BMI 21.52 kg/m²              Physical Exam:    General Appearance: sleeping, coarse productive cough, coopera Note      Cardiac:  Regularity: Regular  Rate: Normal  Heart Sounds: S1,S2    Lungs:   Right: Rhonchi  Left: Rhonchi    Peripheral Pulses:  Radial: Right 1+ or Left 1+      Capillary Refill:  <3 Secs    Skin:  Temp/Moisture: Warm and Dry  Color: Normal

## 2019-09-06 NOTE — ED PROVIDER NOTES
Patient Seen in: [facility] Emergency Department    History   Patient presents with:  Dyspnea ASHLEY SOB (respiratory)  Fatigue (constitutional, neurologic)    Stated Complaint: pneumonia    HPI    27-year-old male with history of CHF, COPD, dementia, is awake, alert,in no acute distress. HEENT: Pupils equal round reactive to light, extraocular muscles are intact, there is no scleral icterus. Mucous membranes are moist, oropharynx is clear, uvula midline. Scalp is atraumatic.   NECK: Neck is supple, DIFFERENTIAL - Abnormal; Notable for the following components:    RBC 3.40 (*)     HGB 12.1 (*)     HCT 34.4 (*)     .0 (*)     .2 (*)     MCH 35.6 (*)     RDW 15.6 (*)     RDW-SD 49.7 (*)     Lymphocyte Absolute 0.34 (*)     All other compon Plan     Clinical Impression:  Elevated troponin  (primary encounter diagnosis)  Pneumonia due to infectious organism, unspecified laterality, unspecified part of lung  Acute on chronic congestive heart failure, unspecified heart failure type ([facility] [address]  Hypoxi

## 2019-09-06 NOTE — H&P
[facility]  History and Physical     Kamilah Workman Patient Status:  Emergency    1929 MRN [id number]   Location [address] [facility] [address] Attending Stephanie Montes, [address] [facility] Day # 0 PCP Heaven Daley DO     Chief Complaint: co by mouth daily. Disp: 30 tablet Rfl: 1   Citalopram Hydrobromide 10 MG Oral Tab Take 20 mg by mouth daily.    Disp:  Rfl:    fluticasone-salmeterol 250-50 MCG/DOSE Inhalation Aerosol Powder, Breath Activated Inhale 1 puff into the lungs every 12 (twelve) ho    K 3.9      CO2 26.0   ALKPHO 78   AST 30   ALT 25   BILT 0.5   TP 6.9       Estimated Creatinine Clearance: 35.9 mL/min (A) (based on SCr of 1.34 mg/dL (H)).     Recent Labs   Lab 09/05/19 2047   PTP 16.2*   INR 1.24*       Recent Labs   L

## 2019-09-06 NOTE — CONSULTS
[facility]  Report of Consultation    Nelly Gay Patient Status:  Inpatient    1929 MRN [id number]   [facility] 4SW-A Attending Cuauhtemoc Hurst DO   Hosp Day # 0 PCP Reuben Mcrae DO     Reason for Consultation: Cough, we CABG  1982   • PACEMAKER/DEFIBRILLATOR  2010   • TOTAL HIP REPLACEMENT Right 2015     Family History   Problem Relation Age of Onset   • Cancer Neg    • Heart Disorder Neg       reports that he has quit smoking. His smoking use included cigarettes.  He has (Last 24 hours) at 9/6/2019 0702  Last data filed at 9/6/2019 0429  Gross per 24 hour   Intake 5106 ml   Output 300 ml   Net 4806 ml       Physical Exam:   General: alert, cooperative, oriented. No respiratory distress.    Head: Normocephalic, without obvi under consideration. At present he is developing hypoxia and tachypnea which may indicate worsening pneumonia or congestive heart failure in the face of aggressive fluid resuscitation. A repeat chest x-ray is ordered and pending.   Fluid resuscitation is

## 2019-09-06 NOTE — PROGRESS NOTES
09/06/19 1328   Clinical Encounter Type   Visited With Patient   Sacramental Encounters   Sacrament of Sick-Anointing Patient declined anointing   The patient was seen by Thomas Mclaughlin. Declined all ministry. Respected her time/space.

## 2019-09-06 NOTE — PROGRESS NOTES
Received pt from ER around [address] [facility]. Noted the rt lower FA PIV infiltrated where the Levophed IV was infusing. Med stopped. Warm compress applied per protocol. Site marked. Barbara HEREDIA notified. Phentolamine  SQ given as ordered around the affected site.  Cover

## 2019-09-06 NOTE — PLAN OF CARE
Admitted from ER, AO  to person only. +dementia. On 3L NC. + coarse lung sounds with non prod congested coughing. Suctioned clear secretions. AV paced on the monitor. Afebrile. Condom cath placed.  Bladder scanned around 0545 295 ml  Around 0645, pt star

## 2019-09-06 NOTE — ED INITIAL ASSESSMENT (HPI)
Pt has had URI symptoms for the past 2 days. He has been coughing and was very congested. He has not had a fever.   Pt with PMH of CHF and pleural effusions

## 2019-09-06 NOTE — RESPIRATORY THERAPY NOTE
Received pt on Bipap 12/6 60% abg drawn and results reported. Weaned Fio2 50%. No further changes at this time. BD therapy Q4. BS Diminshed rhonchi.  Will continue to monitor

## 2019-09-06 NOTE — RESPIRATORY THERAPY NOTE
serafin done this am for hypoxia. Results given to MD. Pt is currently on hfnc 6lpm. Nebulizer via mcpap started q4. Will continue to closely monitor.

## 2019-09-06 NOTE — ED INITIAL ASSESSMENT (HPI)
+ Congestion / Fatigue 48hrs - [facility] Chest X-Ray shows fluid in lungs  + Hx: Fluid removed from lungs x3   + Hx: COPD

## 2019-09-06 NOTE — CONSULTS
[facility]  Cardiology Consultation    Lieutenant Herzog Patient Status:  Emergency    1929 MRN [id number]   Location [address] [facility] Attending Ether Sessions, [address] [facility] Day # 0 PCP Leonardo Harrison DO     Reason for Co if not otherwise mention in above HPI.     BP 96/49   Pulse 59   Temp 99.3 °F (37.4 °C) (Oral)   Resp 18   Ht 5' 10\" (1.778 m)   Wt 150 lb (68 kg)   SpO2 99%   BMI 21.52 kg/m²   Temp (24hrs), Av.5 °F (37.5 °C), Min:99.3 °F (37.4 °C), Max:99.6 °F (37.6 represent chronic interstitial changes versus mild edema. 3. Hyperexpansion of the lungs. 4. Small right-sided pleural effusion. 5. Bilateral basilar opacities representing atelectasis versus infiltrates.     Dictated by: Lee Reynolds MD on 9/05/2019 lasix, ACE-I and BB for now  -further mgmt of pneumonia and sepsis per primary service and pulm/CC    Thank you for allowing me to participate in the care of your patient.     Teressa Hughes MD  9/5/2019  10:55 PM

## 2019-09-06 NOTE — PROGRESS NOTES
[facility]  Progress Note     Irasema Street Patient Status:  Inpatient    1929 MRN [id number]   [facility] 4SW-A Attending Brian Hanley DO   Hosp Day # 0 PCP Hernando Zuniga, DO     Chief Complaint: Cough    S: Patient seen reviewed in Epic.     Medications:   • aspirin  81 mg Oral Daily   • escitalopram  10 mg Oral Daily   • Fluticasone Furoate-Vilanterol  1 puff Inhalation Daily   • Memantine HCl  10 mg Oral BID   • Montelukast Sodium  10 mg Oral Nightly   • primidone  50 mg

## 2019-09-06 NOTE — PROGRESS NOTES
Pharmacy Note: Renal dose adjustment for Metoclopramide (Reglan)  Emily Smith has been prescribed Metoclopramide (Reglan) 10 mg every 8 hours as needed. Estimated Creatinine Clearance: 35.9 mL/min (A) (based on SCr of 1.34 mg/dL (H)).     His calculat

## 2019-09-06 NOTE — PLAN OF CARE
Received pt this am slightly drowsy, on 6L hfNC, gurgly, wet cough. On pressors. Able to wean pressors by late am.     Pt attempted to get OOB, bed alarm on, but lost PIV with movement. Continue fall precautions.     Still with wet cough, but productive,

## 2019-09-06 NOTE — ED PROVIDER NOTES
Patient Seen in: [address] [facility]    History   Patient presents with:  Cough/URI    Stated Complaint: chest congestion x 2 days    HPI  35-year-old gentleman with his daughter presents to immediate care with cold cough congestion Drinks per session: 1 or 2      Binge frequency: Never    Drug use: Never             Review of Systems    Positive for stated complaint: chest congestion x 2 days  Other systems are as noted in HPI. Constitutional and vital signs reviewed.       All other EDWARD , XR CHEST AP PORTABLE  (CPT=71045), 3/13/2019, 15:47. EDWARD , XR CHEST AP/PA (1 VIEW) (CPT=71045), 3/02/2019, 8:55. TECHNIQUE:  PA and lateral chest radiographs were obtained.   PATIENT STATED HISTORY: (As transcribed by Technologist)  Cough and

## 2019-09-07 ENCOUNTER — APPOINTMENT (OUTPATIENT)
Dept: GENERAL RADIOLOGY | Facility: HOSPITAL | Age: 84
DRG: 871 | End: 2019-09-07
Attending: INTERNAL MEDICINE
Payer: MEDICARE

## 2019-09-07 PROBLEM — J96.00 ACUTE RESPIRATORY FAILURE (HCC): Status: ACTIVE | Noted: 2019-09-07

## 2019-09-07 LAB
ALBUMIN SERPL-MCNC: 2.6 G/DL (ref 3.4–5)
ALBUMIN/GLOB SERPL: 0.8 {RATIO} (ref 1–2)
ALP LIVER SERPL-CCNC: 72 U/L (ref 45–117)
ALT SERPL-CCNC: 39 U/L (ref 16–61)
ANION GAP SERPL CALC-SCNC: 9 MMOL/L (ref 0–18)
AST SERPL-CCNC: 39 U/L (ref 15–37)
BASOPHILS # BLD AUTO: 0.01 X10(3) UL (ref 0–0.2)
BASOPHILS NFR BLD AUTO: 0.1 %
BILIRUB SERPL-MCNC: 0.4 MG/DL (ref 0.1–2)
BUN BLD-MCNC: 30 MG/DL (ref 7–18)
BUN/CREAT SERPL: 26.1 (ref 10–20)
CALCIUM BLD-MCNC: 8.2 MG/DL (ref 8.5–10.1)
CHLORIDE SERPL-SCNC: 112 MMOL/L (ref 98–112)
CO2 SERPL-SCNC: 23 MMOL/L (ref 21–32)
CREAT BLD-MCNC: 1.15 MG/DL (ref 0.7–1.3)
DEPRECATED RDW RBC AUTO: 50.4 FL (ref 35.1–46.3)
EOSINOPHIL # BLD AUTO: 0 X10(3) UL (ref 0–0.7)
EOSINOPHIL NFR BLD AUTO: 0 %
ERYTHROCYTE [DISTWIDTH] IN BLOOD BY AUTOMATED COUNT: 14.1 % (ref 11–15)
GLOBULIN PLAS-MCNC: 3.3 G/DL (ref 2.8–4.4)
GLUCOSE BLD-MCNC: 177 MG/DL (ref 70–99)
HAV IGM SER QL: 2.1 MG/DL (ref 1.6–2.6)
HCT VFR BLD AUTO: 36 % (ref 39–53)
HGB BLD-MCNC: 11.8 G/DL (ref 13–17.5)
IMM GRANULOCYTES # BLD AUTO: 0.04 X10(3) UL (ref 0–1)
IMM GRANULOCYTES NFR BLD: 0.4 %
L PNEUMO AG UR QL: NEGATIVE
LYMPHOCYTES # BLD AUTO: 0.59 X10(3) UL (ref 1–4)
LYMPHOCYTES NFR BLD AUTO: 5.9 %
M PROTEIN MFR SERPL ELPH: 5.9 G/DL (ref 6.4–8.2)
MCH RBC QN AUTO: 32.1 PG (ref 26–34)
MCHC RBC AUTO-ENTMCNC: 32.8 G/DL (ref 31–37)
MCV RBC AUTO: 97.8 FL (ref 80–100)
MONOCYTES # BLD AUTO: 1.1 X10(3) UL (ref 0.1–1)
MONOCYTES NFR BLD AUTO: 11 %
NEUTROPHILS # BLD AUTO: 8.25 X10 (3) UL (ref 1.5–7.7)
NEUTROPHILS # BLD AUTO: 8.25 X10(3) UL (ref 1.5–7.7)
NEUTROPHILS NFR BLD AUTO: 82.6 %
OSMOLALITY SERPL CALC.SUM OF ELEC: 309 MOSM/KG (ref 275–295)
PHOSPHATE SERPL-MCNC: 2.4 MG/DL (ref 2.5–4.9)
PLATELET # BLD AUTO: 137 10(3)UL (ref 150–450)
POTASSIUM SERPL-SCNC: 4 MMOL/L (ref 3.5–5.1)
RBC # BLD AUTO: 3.68 X10(6)UL (ref 3.8–5.8)
SODIUM SERPL-SCNC: 144 MMOL/L (ref 136–145)
STREP PNEUMO ANTIGEN, URINE: NEGATIVE
WBC # BLD AUTO: 10 X10(3) UL (ref 4–11)

## 2019-09-07 PROCEDURE — 99233 SBSQ HOSP IP/OBS HIGH 50: CPT | Performed by: INTERNAL MEDICINE

## 2019-09-07 PROCEDURE — 71045 X-RAY EXAM CHEST 1 VIEW: CPT | Performed by: INTERNAL MEDICINE

## 2019-09-07 RX ORDER — DEXMEDETOMIDINE HYDROCHLORIDE 4 UG/ML
INJECTION, SOLUTION INTRAVENOUS CONTINUOUS
Status: DISCONTINUED | OUTPATIENT
Start: 2019-09-07 | End: 2019-09-11

## 2019-09-07 RX ORDER — ACETAMINOPHEN 650 MG/1
650 SUPPOSITORY RECTAL EVERY 6 HOURS PRN
Status: DISCONTINUED | OUTPATIENT
Start: 2019-09-07 | End: 2019-09-14

## 2019-09-07 RX ORDER — DEXTROSE AND SODIUM CHLORIDE 5; .45 G/100ML; G/100ML
INJECTION, SOLUTION INTRAVENOUS CONTINUOUS
Status: DISCONTINUED | OUTPATIENT
Start: 2019-09-07 | End: 2019-09-09

## 2019-09-07 RX ORDER — FAMOTIDINE 10 MG/ML
20 INJECTION, SOLUTION INTRAVENOUS DAILY
Status: DISCONTINUED | OUTPATIENT
Start: 2019-09-07 | End: 2019-09-11

## 2019-09-07 NOTE — SLP NOTE
ADULT SWALLOWING EVALUATION    ASSESSMENT    ASSESSMENT/OVERALL IMPRESSION:  Bedside swallow evaluation completed. Per chart, pt admitted with cough and SOB, pt found to have PNA on CXR and was brought to the ED.  Pt determined to have ARF and suspected asp Peripheral vascular disease ([facility] [address]    • Pneumonia due to organism    • Shortness of breath    • Visual impairment           Diet Prior to Admission: Regular; Thin liquids  Precautions: Aspiration    Patient/Family Goals: to eat and drink    SWALLOWING HISTOR reassess  Number of Visits to Meet Established Goals: 3  Follow Up Needed: Yes  SLP Follow-up Date: 09/08/19    Thank you for your referral.   If you have any questions, please contact Kenneth Forte

## 2019-09-07 NOTE — PROGRESS NOTES
[facility] Pharmacy Note:  Renal Dose Adjustment    Kalin Smith has been prescribed famotidine (PEPCID) 20 mg intravenously every 12 hours. Estimated Creatinine Clearance: 43.7 mL/min (based on SCr of 1.15 mg/dL).     His calculated creatinine clearance is <

## 2019-09-07 NOTE — PROGRESS NOTES
[facility]  [facility] Cardiology Progress Note    Samuel Butler Patient Status:  Inpatient    1929 MRN [id number]   [facility] 4SW-A Attending Ulysses Peace, DO   Hosp Day # 0 PCP Tonie Hutton DO       Assessment & Plan:  80 y %    Intake/Output:     Intake/Output Summary (Last 24 hours) at 9/6/2019 2032  Last data filed at 9/6/2019 2026  Gross per 24 hour   Intake 6275 ml   Output 300 ml   Net 5975 ml       Physical Exam:  BP (!) 88/48   Pulse 80   Temp 97.9 °F (36.6 °C) (Tempo

## 2019-09-07 NOTE — PROGRESS NOTES
[facility] Pulmonary/Critical Care Progress Note     SUBJECTIVE/24H Events: All events, procedures, notes reviewed. No acute events overnight, tolerated BPAP overnight.   RN notes patient was more somnolent overnight and examined          Lab Data Review:   Recent Labs   Lab 09/05/19 2047 09/06/19  0457 09/07/19  0439   * 105* 177*   BUN 26* 25* 30*   CREATSERUM 1.34* 1.08 1.15   GFRAA 54* 70 65   GFRNAA 47* 61 56*   CA 8.6 7.5* 8.2*    141 144   K 3.9 3.6 4. MD            Xr Chest Ap Portable  (cpt=71045)    Result Date: 9/7/2019  CONCLUSION:  1. Persistent but mildly improved airspace disease within the right perihilar region and mid right lung. Mild bibasilar airspace disease, unchanged.  2. Mild pulmonary v compared to previous which shows continued pulmonary vascular congestion, possible alveolar opacities as well on right. Chronic changes noted to right with stable right effusion       ASSESSMENT   1. Septic shock due to pneumonia  2.  Pneumonia - possibly c

## 2019-09-07 NOTE — RESPIRATORY THERAPY NOTE
Patient received on bipap 12/6 40%. All vital signs stable. Transitioned patient to 6l nasal canula. Tolerated well. mcpap treatments given as ordered. Patient went back on bipap later in the afternoon after visitors came and left.  Patient wanted to rest

## 2019-09-07 NOTE — PROGRESS NOTES
09/07/19 0514   BiPAP   $ RT Standby Charge (per 15 min) 1   $ Vent Care / Non-Invasive Subsequent Day Yes   Device V60   BiPAP / CPAP CE# 6053   Mode Spontaneous/Timed   Interface Full face mask   Mask Size Medium   Control Settings   Set Rate 14 breat

## 2019-09-07 NOTE — PLAN OF CARE
0800: Took over patient care in am. Patient was on BIPAP at night. Awake with episodes of drowsiness. Vital signs taken and monitored. 0900: Up in bed to chair using  lift support. On 6 L hiflo NC. Swallow eval done. Failed. Will follow up lu.  Ok to Cole Garcia

## 2019-09-07 NOTE — PROGRESS NOTES
[facility]  Progress Note     Irasema Street Patient Status:  Inpatient    1929 MRN [id number]   [facility] 4SW-A Attending Brian Hanley DO   Hosp Day # 1 PCP Hernando Zuniga DO     Chief Complaint: Cough    S: Patient seen Clearance: 43.7 mL/min (based on SCr of 1.15 mg/dL).     Recent Labs   Lab 09/05/19 2047   PTP 16.2*   INR 1.24*       Recent Labs   Lab 09/05/19 2047 09/06/19  0020 09/06/19  0457   TROP 0.053* 0.065* <0.045            Imaging: Imaging data reviewed in E

## 2019-09-07 NOTE — PLAN OF CARE
Received drowsy and oriented self and place, with baseline dementia; On BIPAP 40%, maintaining sats. Vpaced on the monitor. Afebrile. On levopehed, titrated to maintain MAP 60-65. PICC line patent and intact.    0030- Bladder scanned/ str cath 500 ml out; P Monitor labs and assess for signs and symptoms of volume excess or deficit  - Monitor intake, output and patient weight  - Monitor urine specific gravity, serum osmolarity and serum sodium as indicated or ordered  - Monitor response to interventions for pa

## 2019-09-07 NOTE — PROGRESS NOTES
[facility] Cardiology  Progress Note    Navjot Ngo Patient Status:  Inpatient    1929 MRN [id number]   [facility] 4SW-A Attending Misael Lockhart DO   Hosp Day # 1 PCP Diana Anglin DO     Subjective:  Feels a little better today. dysfunction and septic shock (HCC)    Septic shock (HCC)      Acute pneumonia with respiratory failure. Recurrent pneumonia suggests possible aspiration. Continue antibiotics and supportive care. Compensated cardiac status.     Dewayne Cheadle  9/7/2019  2

## 2019-09-08 ENCOUNTER — APPOINTMENT (OUTPATIENT)
Dept: GENERAL RADIOLOGY | Facility: HOSPITAL | Age: 84
DRG: 871 | End: 2019-09-08
Attending: INTERNAL MEDICINE
Payer: MEDICARE

## 2019-09-08 PROBLEM — Z95.1 HISTORY OF CORONARY ARTERY BYPASS GRAFT: Chronic | Status: ACTIVE | Noted: 2019-09-08

## 2019-09-08 PROBLEM — I25.10 CORONARY ARTERY DISEASE INVOLVING NATIVE CORONARY ARTERY OF NATIVE HEART: Chronic | Status: ACTIVE | Noted: 2019-03-01

## 2019-09-08 PROBLEM — Z95.0 HISTORY OF CARDIAC PACEMAKER: Chronic | Status: ACTIVE | Noted: 2019-09-08

## 2019-09-08 LAB
ALBUMIN SERPL-MCNC: 2.5 G/DL (ref 3.4–5)
ALBUMIN/GLOB SERPL: 0.7 {RATIO} (ref 1–2)
ALLENS TEST: POSITIVE
ALP LIVER SERPL-CCNC: 74 U/L (ref 45–117)
ALT SERPL-CCNC: 32 U/L (ref 16–61)
ANION GAP SERPL CALC-SCNC: 7 MMOL/L (ref 0–18)
ARTERIAL BLD GAS O2 SATURATION: 97 % (ref 92–100)
ARTERIAL BLOOD GAS BASE EXCESS: -1.8 MMOL/L (ref ?–2)
ARTERIAL BLOOD GAS HCO3: 21.4 MEQ/L (ref 22–26)
ARTERIAL BLOOD GAS PCO2: 31 MM HG (ref 35–45)
ARTERIAL BLOOD GAS PH: 7.46 (ref 7.35–7.45)
ARTERIAL BLOOD GAS PO2: 124 MM HG (ref 80–105)
AST SERPL-CCNC: 30 U/L (ref 15–37)
BASOPHILS # BLD AUTO: 0.01 X10(3) UL (ref 0–0.2)
BASOPHILS NFR BLD AUTO: 0.1 %
BILIRUB SERPL-MCNC: 0.7 MG/DL (ref 0.1–2)
BUN BLD-MCNC: 21 MG/DL (ref 7–18)
BUN/CREAT SERPL: 20 (ref 10–20)
CALCIUM BLD-MCNC: 8.2 MG/DL (ref 8.5–10.1)
CALCULATED O2 SATURATION: 99 % (ref 92–100)
CARBOXYHEMOGLOBIN: 1.3 % SAT (ref 0–3)
CHLORIDE SERPL-SCNC: 112 MMOL/L (ref 98–112)
CO2 SERPL-SCNC: 25 MMOL/L (ref 21–32)
CREAT BLD-MCNC: 1.05 MG/DL (ref 0.7–1.3)
DEPRECATED RDW RBC AUTO: 50.4 FL (ref 35.1–46.3)
EOSINOPHIL # BLD AUTO: 0 X10(3) UL (ref 0–0.7)
EOSINOPHIL NFR BLD AUTO: 0 %
ERYTHROCYTE [DISTWIDTH] IN BLOOD BY AUTOMATED COUNT: 13.8 % (ref 11–15)
EXPIRATORY PRESSURE: 6 CM H2O
FIO2: 40 %
GLOBULIN PLAS-MCNC: 3.4 G/DL (ref 2.8–4.4)
GLUCOSE BLD-MCNC: 228 MG/DL (ref 70–99)
HAV IGM SER QL: 2 MG/DL (ref 1.6–2.6)
HCT VFR BLD AUTO: 35.3 % (ref 39–53)
HGB BLD-MCNC: 11.2 G/DL (ref 13–17.5)
IMM GRANULOCYTES # BLD AUTO: 0.05 X10(3) UL (ref 0–1)
IMM GRANULOCYTES NFR BLD: 0.5 %
INSP PRESSURE: 12 CM H2O
IONIZED CALCIUM: 1.09 MMOL/L (ref 1.12–1.32)
LACTIC ACID ARTERIAL: 2.4 MMOL/L (ref 0.5–2)
LYMPHOCYTES # BLD AUTO: 0.47 X10(3) UL (ref 1–4)
LYMPHOCYTES NFR BLD AUTO: 4.6 %
M PROTEIN MFR SERPL ELPH: 5.9 G/DL (ref 6.4–8.2)
MCH RBC QN AUTO: 31.2 PG (ref 26–34)
MCHC RBC AUTO-ENTMCNC: 31.7 G/DL (ref 31–37)
MCV RBC AUTO: 98.3 FL (ref 80–100)
METHEMOGLOBIN: 0.5 % SAT (ref 0.4–1.5)
MONOCYTES # BLD AUTO: 1.05 X10(3) UL (ref 0.1–1)
MONOCYTES NFR BLD AUTO: 10.2 %
NEUTROPHILS # BLD AUTO: 8.73 X10 (3) UL (ref 1.5–7.7)
NEUTROPHILS # BLD AUTO: 8.73 X10(3) UL (ref 1.5–7.7)
NEUTROPHILS NFR BLD AUTO: 84.6 %
OSMOLALITY SERPL CALC.SUM OF ELEC: 308 MOSM/KG (ref 275–295)
PATIENT TEMPERATURE: 97.7 F
PHOSPHATE SERPL-MCNC: 1.5 MG/DL (ref 2.5–4.9)
PLATELET # BLD AUTO: 127 10(3)UL (ref 150–450)
POTASSIUM BLOOD GAS: 3.5 MMOL/L (ref 3.6–5.1)
POTASSIUM SERPL-SCNC: 3.5 MMOL/L (ref 3.5–5.1)
RBC # BLD AUTO: 3.59 X10(6)UL (ref 3.8–5.8)
SODIUM BLOOD GAS: 140 MMOL/L (ref 136–144)
SODIUM SERPL-SCNC: 144 MMOL/L (ref 136–145)
TOTAL HEMOGLOBIN: 11.5 G/DL (ref 12.6–17.4)
WBC # BLD AUTO: 10.3 X10(3) UL (ref 4–11)

## 2019-09-08 PROCEDURE — 71045 X-RAY EXAM CHEST 1 VIEW: CPT | Performed by: INTERNAL MEDICINE

## 2019-09-08 PROCEDURE — 4B02XSZ MEASUREMENT OF CARDIAC PACEMAKER, EXTERNAL APPROACH: ICD-10-PCS | Performed by: INTERNAL MEDICINE

## 2019-09-08 PROCEDURE — 99233 SBSQ HOSP IP/OBS HIGH 50: CPT | Performed by: INTERNAL MEDICINE

## 2019-09-08 RX ORDER — POLYVINYL ALCOHOL 14 MG/ML
1 SOLUTION/ DROPS OPHTHALMIC 4 TIMES DAILY PRN
Status: DISCONTINUED | OUTPATIENT
Start: 2019-09-08 | End: 2019-09-14

## 2019-09-08 RX ORDER — FUROSEMIDE 10 MG/ML
20 INJECTION INTRAMUSCULAR; INTRAVENOUS
Status: COMPLETED | OUTPATIENT
Start: 2019-09-08 | End: 2019-09-08

## 2019-09-08 NOTE — PROGRESS NOTES
[facility] Cardiology  Progress Note    Rola Ritter Patient Status:  Inpatient    1929 MRN [id number]   [facility] 4SW-A Attending Jarad Hope DO   Hosp Day # 2 PCP Dallin Quinonez DO     Subjective:  Had a rough night with fevers CABG    Hx PPM    Compensated cardiac status, tenuous overall status with respiratory failure. Continue cardiac supportive care. D/W pt and daughters in detail.     Sybil Barahona  9/8/2019  11:08 AM

## 2019-09-08 NOTE — SLP NOTE
ADULT SWALLOWING RE-EVALUATION    ASSESSMENT    ASSESSMENT/OVERALL IMPRESSION:  Brief Background Information: Per H&P, Lakshmi Blood is a 80year old male with a past medical history of COPD, CHF, dementia.   He has a pacer and has had three thoracentesis oropharyngeal swallow function, r/o aspiration, assess compensatory strategies to optimize safe swallowing and recommend safest, most least restrictive diet.           RECOMMENDATIONS   Diet Recommendations - Solids: NPO  Diet Recommendations - Liquid: NPO strict NPO at this time with oral care, and SLP to re-assess swallow function to determine candidacy for po diet/VFSS. RN notified of recommendations.     Diet Recommendations - Solids: NPO   Diet Recommendations - Liquid: NPO    Medication Administration R OBJECTIVE   ORAL MOTOR EXAMINATION  Dentition: Natural;Functional  Symmetry: Within Functional Limits  Strength: Reduced right lingual;Reduced left lingual  Tone: Within Functional Limits  Range of Motion: Within Functional Limits  Rate of Motion: Re

## 2019-09-08 NOTE — PLAN OF CARE
Re-evaluation completed this date; education provided to family present of POC.    Problem: Impaired Swallowing  Goal: Minimize aspiration risk  Description  Interventions:  - NPO  - frequent oral care to maintain oral hydration  - VFSS planned   Outcome: P

## 2019-09-08 NOTE — PROGRESS NOTES
Received pt on bipap support. Tolerated well through night with no changes made. Nebs given as ordered. Will continue to monitor.        09/08/19 0529   BiPAP   $ RT Standby Charge (per 15 min) 1   Device V60   BiPAP / CPAP CE# 6053   Mode Spontaneous/Timed

## 2019-09-08 NOTE — PLAN OF CARE
Took over patient care in am, Vital signs monitored. Levophed titrated to keep MAP of 60-65. Precedex currently at 0.4 for anxiety and agitation. Patient calm, confused at times but able to follow simple commands. I and O monitored.  Bladder scan, straight

## 2019-09-08 NOTE — PROGRESS NOTES
[facility] Pulmonary/Critical Care Progress Note     SUBJECTIVE/24H Events: All events, procedures, notes reviewed. Febrile and agitated overnight, improved this morning.   Unfortunately, I was not contacted during this lesions.   Lymph nodes:Not examined          Lab Data Review:   Recent Labs   Lab 09/06/19  0457 09/07/19  0439 09/08/19  0439   * 177* 228*   BUN 25* 30* 21*   CREATSERUM 1.08 1.15 1.05   GFRAA 70 65 72   GFRNAA 61 56* 63   CA 7.5* 8.2* 8.2*   NA 14 Approved by: Veda Polk MD            Xr Chest Ap Portable  (cpt=71045)    Result Date: 9/8/2019  CONCLUSION:    Little significant change in bilateral mixed interstitial and airspace infiltrates, although there may be slight increase in left perihil Daily   • escitalopram  10 mg Oral Daily   • Fluticasone Furoate-Vilanterol  1 puff Inhalation Daily   • Memantine HCl  10 mg Oral BID   • Montelukast Sodium  10 mg Oral Nightly   • primidone  50 mg Oral TID   • enoxaparin  40 mg Subcutaneous Daily   • ipr >89%  · Ppx: LMWH, H2B  · DNR  · Dispo: ICU    Andresus Ronan MD  [facility]  CCM time 35minutes

## 2019-09-08 NOTE — PLAN OF CARE
Received pt alert, confused, following commands. Occasional anxiety/agitation relieved by Precedex gtt. On 8L hi-hanny nasal cannula transitioned to Bipap 40% while asleep. Diminished breath sounds with non-productive cough. Temperature max 101.8.  Ice packs

## 2019-09-08 NOTE — ICD/PM
[employer]tr[employer] remote pacemaker interrogation reviewed with Constantin Hitchcock. Has known PAF last episode was in April. Today had mode switch episode for recurrent AFib.   The nurses called Dr. Yovany Baez with concern for pacemaker non capture but it is AFib w/ appropria

## 2019-09-08 NOTE — PROGRESS NOTES
[facility]  Progress Note     Leyla Petersen Patient Status:  Inpatient    1929 MRN [id number]   [facility] 4SW-A Attending Kia Antonio DO   Hosp Day # 2 PCP Carol Mei DO     Chief Complaint: Cough    S: Patient seen 6.9  --  5.9* 5.9*       Estimated Creatinine Clearance: 47.8 mL/min (based on SCr of 1.05 mg/dL).     Recent Labs   Lab 09/05/19 2047   PTP 16.2*   INR 1.24*       Recent Labs   Lab 09/05/19 2047 09/06/19  0020 09/06/19  0457   TROP 0.053* 0.065* <0.045 Patient/RN.     Keven Fee, DO

## 2019-09-08 NOTE — RESPIRATORY THERAPY NOTE
Patient received on bipap 12/6. Transitioned to 4l nasal canula. Tolerated well. mcpap treatments given as ordered. Will cont to monitor closely.

## 2019-09-09 ENCOUNTER — APPOINTMENT (OUTPATIENT)
Dept: GENERAL RADIOLOGY | Facility: HOSPITAL | Age: 84
DRG: 871 | End: 2019-09-09
Attending: INTERNAL MEDICINE
Payer: MEDICARE

## 2019-09-09 LAB
ALBUMIN SERPL-MCNC: 2.2 G/DL (ref 3.4–5)
ALBUMIN/GLOB SERPL: 0.6 {RATIO} (ref 1–2)
ALLENS TEST: POSITIVE
ALP LIVER SERPL-CCNC: 61 U/L (ref 45–117)
ALT SERPL-CCNC: 27 U/L (ref 16–61)
ANION GAP SERPL CALC-SCNC: 8 MMOL/L (ref 0–18)
ARTERIAL BLD GAS O2 SATURATION: 97 % (ref 92–100)
ARTERIAL BLOOD GAS BASE EXCESS: -0.6 MMOL/L (ref ?–2)
ARTERIAL BLOOD GAS HCO3: 22.6 MEQ/L (ref 22–26)
ARTERIAL BLOOD GAS PCO2: 33 MM HG (ref 35–45)
ARTERIAL BLOOD GAS PH: 7.46 (ref 7.35–7.45)
ARTERIAL BLOOD GAS PO2: 130 MM HG (ref 80–105)
AST SERPL-CCNC: 21 U/L (ref 15–37)
BASOPHILS # BLD AUTO: 0.01 X10(3) UL (ref 0–0.2)
BASOPHILS NFR BLD AUTO: 0.1 %
BILIRUB SERPL-MCNC: 0.8 MG/DL (ref 0.1–2)
BUN BLD-MCNC: 14 MG/DL (ref 7–18)
BUN/CREAT SERPL: 15.1 (ref 10–20)
CALCIUM BLD-MCNC: 7.7 MG/DL (ref 8.5–10.1)
CALCULATED O2 SATURATION: 99 % (ref 92–100)
CARBOXYHEMOGLOBIN: 1.4 % SAT (ref 0–3)
CHLORIDE SERPL-SCNC: 110 MMOL/L (ref 98–112)
CO2 SERPL-SCNC: 26 MMOL/L (ref 21–32)
CREAT BLD-MCNC: 0.93 MG/DL (ref 0.7–1.3)
DEPRECATED RDW RBC AUTO: 49.1 FL (ref 35.1–46.3)
EOSINOPHIL # BLD AUTO: 0.06 X10(3) UL (ref 0–0.7)
EOSINOPHIL NFR BLD AUTO: 0.8 %
ERYTHROCYTE [DISTWIDTH] IN BLOOD BY AUTOMATED COUNT: 13.8 % (ref 11–15)
EXPIRATORY PRESSURE: 6 CM H2O
FIO2: 40 %
GLOBULIN PLAS-MCNC: 3.5 G/DL (ref 2.8–4.4)
GLUCOSE BLD-MCNC: 171 MG/DL (ref 70–99)
HAV IGM SER QL: 1.6 MG/DL (ref 1.6–2.6)
HCT VFR BLD AUTO: 33.8 % (ref 39–53)
HGB BLD-MCNC: 10.9 G/DL (ref 13–17.5)
IMM GRANULOCYTES # BLD AUTO: 0.03 X10(3) UL (ref 0–1)
IMM GRANULOCYTES NFR BLD: 0.4 %
INSP PRESSURE: 12 CM H2O
IONIZED CALCIUM: 1.15 MMOL/L (ref 1.12–1.32)
LACTIC ACID ARTERIAL: <1.6 MMOL/L (ref 0.5–2)
LYMPHOCYTES # BLD AUTO: 0.44 X10(3) UL (ref 1–4)
LYMPHOCYTES NFR BLD AUTO: 5.6 %
M PROTEIN MFR SERPL ELPH: 5.7 G/DL (ref 6.4–8.2)
MCH RBC QN AUTO: 31.4 PG (ref 26–34)
MCHC RBC AUTO-ENTMCNC: 32.2 G/DL (ref 31–37)
MCV RBC AUTO: 97.4 FL (ref 80–100)
METHEMOGLOBIN: 0.5 % SAT (ref 0.4–1.5)
MONOCYTES # BLD AUTO: 0.59 X10(3) UL (ref 0.1–1)
MONOCYTES NFR BLD AUTO: 7.6 %
NEUTROPHILS # BLD AUTO: 6.68 X10 (3) UL (ref 1.5–7.7)
NEUTROPHILS # BLD AUTO: 6.68 X10(3) UL (ref 1.5–7.7)
NEUTROPHILS NFR BLD AUTO: 85.5 %
OSMOLALITY SERPL CALC.SUM OF ELEC: 303 MOSM/KG (ref 275–295)
P/F RATIO: 615.6 MMHG
PATIENT TEMPERATURE: 98.9 F
PHOSPHATE SERPL-MCNC: 2 MG/DL (ref 2.5–4.9)
PLATELET # BLD AUTO: 113 10(3)UL (ref 150–450)
POTASSIUM BLOOD GAS: 3.3 MMOL/L (ref 3.6–5.1)
POTASSIUM SERPL-SCNC: 3.1 MMOL/L (ref 3.5–5.1)
POTASSIUM SERPL-SCNC: 3.5 MMOL/L (ref 3.5–5.1)
RBC # BLD AUTO: 3.47 X10(6)UL (ref 3.8–5.8)
SODIUM BLOOD GAS: 139 MMOL/L (ref 136–144)
SODIUM SERPL-SCNC: 144 MMOL/L (ref 136–145)
TOTAL HEMOGLOBIN: 11.8 G/DL (ref 12.6–17.4)
WBC # BLD AUTO: 7.8 X10(3) UL (ref 4–11)

## 2019-09-09 PROCEDURE — 71045 X-RAY EXAM CHEST 1 VIEW: CPT | Performed by: INTERNAL MEDICINE

## 2019-09-09 PROCEDURE — 99233 SBSQ HOSP IP/OBS HIGH 50: CPT | Performed by: INTERNAL MEDICINE

## 2019-09-09 RX ORDER — POTASSIUM CHLORIDE 29.8 MG/ML
40 INJECTION INTRAVENOUS ONCE
Status: COMPLETED | OUTPATIENT
Start: 2019-09-09 | End: 2019-09-10

## 2019-09-09 RX ORDER — POTASSIUM CHLORIDE 29.8 MG/ML
40 INJECTION INTRAVENOUS ONCE
Status: COMPLETED | OUTPATIENT
Start: 2019-09-09 | End: 2019-09-09

## 2019-09-09 RX ORDER — FUROSEMIDE 10 MG/ML
40 INJECTION INTRAMUSCULAR; INTRAVENOUS 3 TIMES DAILY
Status: DISCONTINUED | OUTPATIENT
Start: 2019-09-09 | End: 2019-09-11

## 2019-09-09 RX ORDER — POTASSIUM CHLORIDE 14.9 MG/ML
20 INJECTION INTRAVENOUS ONCE
Status: COMPLETED | OUTPATIENT
Start: 2019-09-09 | End: 2019-09-09

## 2019-09-09 NOTE — PROGRESS NOTES
[facility]  Progress Note    Cynthia Kovacs Patient Status:  Inpatient    1929 MRN [id number]   [facility] ([place], [place] 4SW-A Attending Marquita Chaudhari DO   Hosp Day # 3 PCP Aly Toure DO     Subjective:  Cynthia Kovacs is a(n) 80 year o 11.2* 10.9*   HCT 36.0* 35.3* 33.8*   MCV 97.8 98.3 97.4   MCH 32.1 31.2 31.4   MCHC 32.8 31.7 32.2   RDW 14.1 13.8 13.8   NEPRELIM 8.25* 8.73* 6.68   WBC 10.0 10.3 7.8   .0* 127.0* 113.0*     Recent Labs   Lab 09/07/19  0439 09/08/19  0439 09/09/19 speech eval  · Wean o2 for sats >89%  · Ppx: LMWH, H2B  · DNR  · Dispo: ICU    Overall the patient does not show signs of improvement. He is weak and tachypneic off noninvasive positive pressure ventilation.   Will more aggressively diuresis and follow chloe

## 2019-09-09 NOTE — PROGRESS NOTES
[facility]    Electrophysiology Follow Up Note      Navjot Ngo Patient Status:  Inpatient    1929 MRN [id number]   [facility] 4SW-A Attending Misael Lockhart DO   Hosp Day # 3 PCP Diana Anglin DO     Reason mg, 650 mg, Rectal, Q6H PRN  •  aspirin chewable tab 81 mg, 81 mg, Oral, Daily  •  escitalopram (LEXAPRO) tablet 10 mg, 10 mg, Oral, Daily  •  Fluticasone Furoate-Vilanterol (BREO ELLIPTA) 200-25 MCG/INH inhaler 1 puff, 1 puff, Inhalation, Daily  •  Memant sounds, no wheezing, minimal crackles  Abdomen: Soft, NT, ND, BS+  Extremities: Warm, well perfused, normal cap refill, no edema  Skin: Warm and dry.        Labs:     Recent Labs   Lab 09/07/19  0439 09/08/19  0439 09/09/19  0421   * 228* 171*   BUN you.      Diagnosis:  Patient Active Problem List:     CHF with unknown LVEF ([address]     Pleural effusion     Late onset Alzheimer's disease without behavioral disturbance ([address]     Coronary artery disease involving native coronary artery of native heart

## 2019-09-09 NOTE — PLAN OF CARE
Received pt drowsy, confused, following commands. Occasional anxiety/agitation relieved by Precedex gtt. On Bipap 40% while asleep. Diminished breath sounds with occasional rhonci and non-productive cough. Afebrile. AV paced.  Levophed per MAR to maintain g

## 2019-09-09 NOTE — CM/SW NOTE
09/09/19 1100   CM/SW Screening   Referral Source Physician   Information Source Chart review;Nursing rounds   Patient's Mental Status Alert;Oriented   Patient's Home Environment Senior Independent Housing   Patient lives with Alone   Patient Status Daisy

## 2019-09-09 NOTE — PROGRESS NOTES
[facility]  Progress Note     Juan C Ramesh Patient Status:  Inpatient    1929 MRN [id number]   [facility] 4SW-A Attending Chago Cardona DO   Hosp Day # 3 PCP Bruna Mccabe DO     Chief Complaint: Cough    S: Patient  Up  112 110   CO2 23.0 25.0 26.0   ALKPHO 72 74 61   AST 39* 30 21   ALT 39 32 27   BILT 0.4 0.7 0.8   TP 5.9* 5.9* 5.7*       Estimated Creatinine Clearance: 55.5 mL/min (based on SCr of 0.93 mg/dL).     Recent Labs   Lab 09/05/19  2047   PTP 16.2* status: DNR  · Brar: yes   · Central line: yes PICC    Will the patient be referred to TCC on discharge?: tbd  Estimated date of discharge: tbd  Discharge is dependent on: progress  At this point Mr. Smith is expected to be discharge to: tbd    Plan of ca

## 2019-09-09 NOTE — RESPIRATORY THERAPY NOTE
Received Pt on BiPAP 12/6, 40%. AM ABG collected and results entered in Epic. Pt placed on 7 L/HFNC when awake. O2 weaned to 5 lpm. MCPAP Duonebs administered Q4H per order. Pt tolerating well. Will continue to monitor.

## 2019-09-09 NOTE — PHYSICAL THERAPY NOTE
PHYSICAL THERAPY EVALUATION - INPATIENT     Room Number: 471/471-A  Evaluation Date: 9/9/2019  Type of Evaluation: Initial  Physician Order: PT Eval and Treat    Presenting Problem: probable PNA, HF septic shock  Reason for Therapy: Mobility Dysfunct 0       Lives With: Alone  Drives: No  Patient Owned Equipment: Rolling walker       Prior Level of Canadian: PTA reports Ind living at [facility] to dining watson with rw for meals and participates in activities, supportive daughter in area lying on back to sitting on the side of the bed?: A Lot   How much help from another person does the patient currently need. ..   -   Moving to and from a bed to a chair (including a wheelchair)?: A Lot   -   Need to walk in hospital room?: A Lot   -   Clim measures completed include AMPAC. Based on this evaluation, patient's clinical presentation is stable and overall the evaluation complexity is considered low.   These impairments and comorbidities manifest themselves as functional limitations in independen

## 2019-09-09 NOTE — PROGRESS NOTES
Received pt on full vent support. Tolerated well through night no changes made. Nebs given as ordered. Will continue to monitor.        09/09/19 0325   BiPAP   $ RT Standby Charge (per 15 min) 1   Device V60   BiPAP / CPAP CE# 6053   Mode Spontaneous/Timed

## 2019-09-09 NOTE — PLAN OF CARE
Received pt this am aox2-3, some confusion, forgetfulness, overall calm and pleasant. Says he's always had memory problems his whole life. Slightly upset over his inability to remember. Received pt on bipap, to 7l hfnc per RT.      Pt resting in bed; be

## 2019-09-09 NOTE — PROGRESS NOTES
09/09/19 0729   BiPAP   $ RT Standby Charge (per 15 min) 1   Device V60   BiPAP / CPAP CE# 6053   Mode Spontaneous/Timed   Interface Full face mask   Mask Size Medium   Control Settings   Set Rate 14 breaths/min   Set IPAP 12   Set EPAP 6   Oxygen Perce

## 2019-09-09 NOTE — PROGRESS NOTES
09/08/19 1929   BiPAP   $ RT Standby Charge (per 15 min) 1  (neb tx)   Device V60   BiPAP / CPAP CE# 6053   Mode Spontaneous/Timed   Interface Full face mask   Mask Size Medium   Control Settings   Set Rate 14 breaths/min   Set IPAP 12   Set EPAP 6   Ox

## 2019-09-10 ENCOUNTER — APPOINTMENT (OUTPATIENT)
Dept: GENERAL RADIOLOGY | Facility: HOSPITAL | Age: 84
DRG: 871 | End: 2019-09-10
Attending: INTERNAL MEDICINE
Payer: MEDICARE

## 2019-09-10 LAB
BASOPHILS # BLD AUTO: 0.02 X10(3) UL (ref 0–0.2)
BASOPHILS NFR BLD AUTO: 0.3 %
BUN BLD-MCNC: 13 MG/DL (ref 7–18)
CALCIUM BLD-MCNC: 8 MG/DL (ref 8.5–10.1)
CHLORIDE SERPL-SCNC: 108 MMOL/L (ref 98–112)
CO2 SERPL-SCNC: 27 MMOL/L (ref 21–32)
CREAT BLD-MCNC: 1.02 MG/DL (ref 0.7–1.3)
DEPRECATED RDW RBC AUTO: 48.9 FL (ref 35.1–46.3)
EOSINOPHIL # BLD AUTO: 0.12 X10(3) UL (ref 0–0.7)
EOSINOPHIL NFR BLD AUTO: 1.9 %
ERYTHROCYTE [DISTWIDTH] IN BLOOD BY AUTOMATED COUNT: 13.9 % (ref 11–15)
GLUCOSE BLD-MCNC: 138 MG/DL (ref 70–99)
HAV IGM SER QL: 1.9 MG/DL (ref 1.6–2.6)
HCT VFR BLD AUTO: 32.8 % (ref 39–53)
HGB BLD-MCNC: 11 G/DL (ref 13–17.5)
IMM GRANULOCYTES # BLD AUTO: 0.01 X10(3) UL (ref 0–1)
IMM GRANULOCYTES NFR BLD: 0.2 %
LYMPHOCYTES # BLD AUTO: 0.5 X10(3) UL (ref 1–4)
LYMPHOCYTES NFR BLD AUTO: 7.9 %
MCH RBC QN AUTO: 33.3 PG (ref 26–34)
MCHC RBC AUTO-ENTMCNC: 33.5 G/DL (ref 31–37)
MCV RBC AUTO: 99.4 FL (ref 80–100)
MONOCYTES # BLD AUTO: 0.54 X10(3) UL (ref 0.1–1)
MONOCYTES NFR BLD AUTO: 8.5 %
NEUTROPHILS # BLD AUTO: 5.15 X10 (3) UL (ref 1.5–7.7)
NEUTROPHILS # BLD AUTO: 5.15 X10(3) UL (ref 1.5–7.7)
NEUTROPHILS NFR BLD AUTO: 81.2 %
PHOSPHATE SERPL-MCNC: 3.6 MG/DL (ref 2.5–4.9)
PLATELET # BLD AUTO: 114 10(3)UL (ref 150–450)
POTASSIUM SERPL-SCNC: 4 MMOL/L (ref 3.5–5.1)
RBC # BLD AUTO: 3.3 X10(6)UL (ref 3.8–5.8)
SODIUM SERPL-SCNC: 143 MMOL/L (ref 136–145)
WBC # BLD AUTO: 6.3 X10(3) UL (ref 4–11)

## 2019-09-10 PROCEDURE — 71045 X-RAY EXAM CHEST 1 VIEW: CPT | Performed by: INTERNAL MEDICINE

## 2019-09-10 PROCEDURE — 99233 SBSQ HOSP IP/OBS HIGH 50: CPT | Performed by: INTERNAL MEDICINE

## 2019-09-10 PROCEDURE — 74230 X-RAY XM SWLNG FUNCJ C+: CPT | Performed by: INTERNAL MEDICINE

## 2019-09-10 RX ORDER — METOPROLOL SUCCINATE 25 MG/1
25 TABLET, EXTENDED RELEASE ORAL
Status: DISCONTINUED | OUTPATIENT
Start: 2019-09-10 | End: 2019-09-14

## 2019-09-10 RX ORDER — METOPROLOL TARTRATE 5 MG/5ML
2.5 INJECTION INTRAVENOUS ONCE
Status: COMPLETED | OUTPATIENT
Start: 2019-09-10 | End: 2019-09-10

## 2019-09-10 RX ORDER — METOPROLOL TARTRATE 5 MG/5ML
2.5 INJECTION INTRAVENOUS EVERY 4 HOURS PRN
Status: DISCONTINUED | OUTPATIENT
Start: 2019-09-10 | End: 2019-09-14

## 2019-09-10 NOTE — PLAN OF CARE
Patient afebrile. Norepinephrine titrated to maintain MAP >60-65. Precedex infusion remains off. VFSS completed. Honey thick liquids and mechanical chopped diet. Currently requiring 2L nasal cannula. Up to chair with 2 assist and walker.  Daughter Gracie Garcia at

## 2019-09-10 NOTE — VIDEO SWALLOW STUDY NOTE
ADULT VIDEOFLUOROSCOPIC SWALLOWING STUDY    Admission Date: 9/5/2019  Evaluation Date: 09/10/19  Radiologist: Patricia Lopez    RECOMMENDATIONS   Diet Recommendations - Solids: Mechanical soft chopped  Diet Recommendations - Liquid: Honey thick    Further Foll aspiration    Family/Patient Goals: To eat and drink safely     ASSESSMENT   DYSPHAGIA ASSESSMENT  Test completed in conjunction with Radiologist.  Patient Positioned: Upright;[facility] chair. Patient Viewed: Lateral.  Patient Alertness: Fully alert.   Consiste Effective: Partially  Strategy(ies) Implemented (Nectar Thick): Slow rate;Small bites and sips  Effectiveness: Partial     HONEY THICK LIQUIDS  Method of Presentation: Teaspoon  Oral Phase of Swallow (VFSS - Honey Thick Liquids):  Impaired  Bolus Retrieval rate;Small bites and sips  Effectiveness: Partial  Penetration Aspiration Scale Score: Score 5: Material enters the airway, contacts the vocal folds, and is not ejected from the airway       Overall Impression: Pt presented with moderate-severe oropharynge posterior tongue base retraction by completing effortful swallow every 2-3 bites during all PO intake of solids and liquids with mod verbal and visual cues to clear residue in the valleculae and/or pharynx and to increase base of tongue strength.  Not Addre

## 2019-09-10 NOTE — PROGRESS NOTES
[facility]  Progress Note    Bg Arroyo Patient Status:  Inpatient    1929 MRN [id number]   [facility] 4SW-A Attending Meenu Brown DO   Hosp Day # 4 PCP Franco Kenny DO     Subjective:  Bg Arroyo is a(n) 80 year o 3.59* 3.47* 3.30*   HGB 11.2* 10.9* 11.0*   HCT 35.3* 33.8* 32.8*   MCV 98.3 97.4 99.4   MCH 31.2 31.4 33.3   MCHC 31.7 32.2 33.5   RDW 13.8 13.8 13.9   NEPRELIM 8.73* 6.68 5.15   WBC 10.3 7.8 6.3   .0* 113.0* 114.0*     Recent Labs   Lab 09/08/19 vasopressors for goal MAP >60-65  · Continue diuresis  · Continue bronchodilators and airway clearance measures  · Swallow eval  · Wean o2 for sats >89%  · Ppx: LMWH, H2B  · DNR  · Dispo: ICU     Seems clearly better following diuresis (as well as ongoing

## 2019-09-10 NOTE — PLAN OF CARE
Problem: Impaired Swallowing  Goal: Minimize aspiration risk  Description  Interventions:    Outcome: Progressing

## 2019-09-10 NOTE — RESPIRATORY THERAPY NOTE
Received patient on Bipap, transitioned to 4L high flow nasal cannula. Receiving neb treatments Q4 via mask cpap. Tolerating well. Patient currently on 2L nasal cannula. Will continue to monitor.

## 2019-09-10 NOTE — PROGRESS NOTES
09/10/19 0513   BiPAP   $ RT Standby Charge (per 15 min) 1  (bipap check)   Device V60   BiPAP / CPAP CE# 6053   Mode Spontaneous/Timed   Interface Full face mask   Mask Size Medium   Control Settings   Set Rate 14 breaths/min   Set IPAP 12   Set EPAP 6

## 2019-09-10 NOTE — PLAN OF CARE
Received alert, oriented x 2, intermittently oriented. Remained on Precedex, titrated to RASS 0. On BIPAP 40%, to maintain 89% and above SPO2 goal. Afib on the monitor, on and off pacing. Afebrile. Brar  to bag, diuresing well.    Levophed restarted, to ma and assess patient for signs and symptoms of electrolyte imbalances  - Administer electrolyte replacement as ordered  - Monitor response to electrolyte replacements, including rhythm and repeat lab results as appropriate  - Fluid restriction as ordered  -

## 2019-09-10 NOTE — PHYSICAL THERAPY NOTE
PHYSICAL THERAPY TREATMENT NOTE - INPATIENT    Room Number: 471/471-A     Session: 1   Number of Visits to Meet Established Goals: 5    Presenting Problem: probable PNA, HF septic shock   History related to current admission: admitted with URI symptoms x Ratin  Location: denies       BALANCE                                                                                                                     Static Sitting: Fair  Dynamic Sitting: Fair -           Static Standing: Poor +  Dynamic Standin transfer set up. Pt returned to sitting in chair. Reviewed LE HEP. Pt left with call light in reach. RN aware.      THERAPEUTIC EXERCISES  Lower Extremity Alternating marching  Ankle pumps  Hip AB/AD  LAQ     Upper Extremity      Position Sitting     Repeti in progress 9/10/2019

## 2019-09-10 NOTE — PROGRESS NOTES
[facility]  Progress Note     Orinda Kehr Patient Status:  Inpatient    1929 MRN [id number]   [facility] 4SW-A Attending Keshavulises Mena DO   Hosp Day # 4 PCP Sita Ramirez DO     Chief Complaint: Cough    S: Patient  On 8. 2* 7.7*  --  8.0*   ALB 2.6* 2.5* 2.2*  --   --     144 144  --  143   K 4.0 3.5 3.1* 3.5 4.0    112 110  --  108   CO2 23.0 25.0 26.0  --  27.0   ALKPHO 72 74 61  --   --    AST 39* 30 21  --   --    ALT 39 32 27  --   --    BILT 0.4 0.7 0.8 when BP tolerates  9. Dementia  10. Chronic Normocytic Anemia  11.  Thrombocytopenia    PLAN  On/off levo  On / off precedex  Off BIPAP this am   Referral to [facility] ([facility] AND 7)   TM 99.3   Sputum normal respir carmencita   Needs video swallow study   N

## 2019-09-11 ENCOUNTER — APPOINTMENT (OUTPATIENT)
Dept: GENERAL RADIOLOGY | Facility: HOSPITAL | Age: 84
DRG: 871 | End: 2019-09-11
Attending: INTERNAL MEDICINE
Payer: MEDICARE

## 2019-09-11 LAB
ANION GAP SERPL CALC-SCNC: 5 MMOL/L (ref 0–18)
BUN BLD-MCNC: 13 MG/DL (ref 7–18)
BUN/CREAT SERPL: 10.5 (ref 10–20)
CALCIUM BLD-MCNC: 8.2 MG/DL (ref 8.5–10.1)
CHLORIDE SERPL-SCNC: 102 MMOL/L (ref 98–112)
CO2 SERPL-SCNC: 32 MMOL/L (ref 21–32)
CREAT BLD-MCNC: 1.24 MG/DL (ref 0.7–1.3)
DEPRECATED RDW RBC AUTO: 48.5 FL (ref 35.1–46.3)
ERYTHROCYTE [DISTWIDTH] IN BLOOD BY AUTOMATED COUNT: 13.6 % (ref 11–15)
GLUCOSE BLD-MCNC: 120 MG/DL (ref 70–99)
HCT VFR BLD AUTO: 32.9 % (ref 39–53)
HGB BLD-MCNC: 10.6 G/DL (ref 13–17.5)
MCH RBC QN AUTO: 31.2 PG (ref 26–34)
MCHC RBC AUTO-ENTMCNC: 32.2 G/DL (ref 31–37)
MCV RBC AUTO: 96.8 FL (ref 80–100)
OSMOLALITY SERPL CALC.SUM OF ELEC: 289 MOSM/KG (ref 275–295)
PLATELET # BLD AUTO: 142 10(3)UL (ref 150–450)
POTASSIUM SERPL-SCNC: 3.9 MMOL/L (ref 3.5–5.1)
RBC # BLD AUTO: 3.4 X10(6)UL (ref 3.8–5.8)
SODIUM SERPL-SCNC: 139 MMOL/L (ref 136–145)
WBC # BLD AUTO: 6.4 X10(3) UL (ref 4–11)

## 2019-09-11 PROCEDURE — 99232 SBSQ HOSP IP/OBS MODERATE 35: CPT | Performed by: INTERNAL MEDICINE

## 2019-09-11 PROCEDURE — 71045 X-RAY EXAM CHEST 1 VIEW: CPT | Performed by: INTERNAL MEDICINE

## 2019-09-11 RX ORDER — FUROSEMIDE 10 MG/ML
40 INJECTION INTRAMUSCULAR; INTRAVENOUS
Status: DISCONTINUED | OUTPATIENT
Start: 2019-09-11 | End: 2019-09-12

## 2019-09-11 RX ORDER — FAMOTIDINE 20 MG/1
20 TABLET ORAL DAILY
Status: DISCONTINUED | OUTPATIENT
Start: 2019-09-12 | End: 2019-09-11

## 2019-09-11 NOTE — DIETARY NOTE
Nutrition Short Note  3 day calorie count started by MD - envelope hanging on door and will be monitored daily by RD. Pt ate 100% of last 2 meals.    Date: 9/11/19  Breakfast: 240 calories, 3 grams protein  Lunch: 350 calories, 12 grams protein  Dinner:

## 2019-09-11 NOTE — DIETARY NOTE
[facility] [address] [place]     Admitting diagnosis:  Hypoxia [R09.02]  Elevated troponin [R74.8]  Pneumonia due to infectious organism, unspecified laterality, unspecified part of lung [J18.9]  Acute on chronic congestive heart

## 2019-09-11 NOTE — PROGRESS NOTES
[facility]  Progress Note     Juan C Ramesh Patient Status:  Inpatient    1929 MRN [id number]   [facility] 4SW-A Attending Chago Cardona DO   Hosp Day # 5 PCP Bruna Mccabe DO     Chief Complaint: Cough    S: Patient   Th 1. 05 0.93  --  1.02 1.24   GFRAA 65 72 84  --  75 59*   GFRNAA 56* 63 73  --  65 51*   CA 8.2* 8.2* 7.7*  --  8.0* 8.2*   ALB 2.6* 2.5* 2.2*  --   --   --     144 144  --  143 139   K 4.0 3.5 3.1* 3.5 4.0 3.9    112 110  --  108 102   CO2 23.0 10. Chronic Normocytic Anemia  11. Thrombocytopenia    PLAN  SW following referral sent to [facility] pat's   Normally lives at [facility]-  =====  CONCLUSION:    1. Hyperexpansion of the lungs.     2. Small bilateral pleural effusions, left greater than rig

## 2019-09-11 NOTE — PLAN OF CARE
Received alert and oriented on and off; confused at shift change. On RA; BIPAP at bedtime  Afib on the monitor. -140`s. APN notified. IV Lopressor PRN and scheduled LOpressor PO ordered.   Levo restarted at 0245 til 0600 ,for transient low BP  0600 L electrolyte replacement as ordered  - Monitor response to electrolyte replacements, including rhythm and repeat lab results as appropriate  - Fluid restriction as ordered  - Instruct patient on fluid and nutrition restrictions as appropriate  Outcome: Prog

## 2019-09-11 NOTE — RESPIRATORY THERAPY NOTE
Patient received on room air at 95% this AM. Breath sounds- diminished. Patient reaching 1250 on IS. Plan to continue nebs as ordered.

## 2019-09-11 NOTE — PROGRESS NOTES
[facility]  Progress Note    Alpa Leger Patient Status:  Inpatient    1929 MRN [id number]   [facility] 4SW-A Attending Sofia Lopez DO   Hosp Day # 5 PCP Nisha Wheeler DO     Subjective:  Alpa Leger is a(n) 80 year o 09/11/19  0408   RBC 3.59* 3.47* 3.30* 3.40*   HGB 11.2* 10.9* 11.0* 10.6*   HCT 35.3* 33.8* 32.8* 32.9*   MCV 98.3 97.4 99.4 96.8   MCH 31.2 31.4 33.3 31.2   MCHC 31.7 32.2 33.5 32.2   RDW 13.8 13.8 13.9 13.6   NEPRELIM 8.73* 6.68 5.15  --    WBC 10.3 7.8 for goal MAP >60-65  · Continue diuresis  · Continue bronchodilators and airway clearance measures  · Wean o2 for sats >89%  · Ppx: LMWH, H2B  · DNR  · Dispo: ICU     Although hemodynamics a bit labile, patient is overall improved.   He is off oxygen and de

## 2019-09-11 NOTE — PROGRESS NOTES
Patient having increased HR--AFib 110-140. Had been given one time 2,5mg IV Metoprolol but RN reporting HR increased >120 again at times. Passed swallow per SLP.   Will resume home Metoprolol Succinate ER 25mg PO BID dosing and add prn Metoprolol 2.5mg IV

## 2019-09-11 NOTE — SLP NOTE
SPEECH DAILY NOTE - INPATIENT    ASSESSMENT & PLAN   ASSESSMENT  Pt seen at bedside this PM for speech therapy services. Pt cooperative, pleasant, and alert. Per RN, pt tolerating diet well with no concerns of aspiration.  Trial honey thick liquids via cup, CP to assure safe/efficiency swallow.   Not Addressed   Goal #5 Pt will increase posterior tongue base retraction by completing effortful swallow every 2-3 bites during all PO intake of solids and liquids with mod verbal and visual cues to clear residue in

## 2019-09-11 NOTE — PROGRESS NOTES
09/11/19 0335   BiPAP   $ RT Standby Charge (per 15 min) 1   Device V60   BiPAP / CPAP CE# 6053   Mode Spontaneous/Timed   Interface Full face mask   Mask Size Medium   Control Settings   Set Rate 14 breaths/min   Set IPAP 12   Set EPAP 6   Oxygen Perce

## 2019-09-12 ENCOUNTER — APPOINTMENT (OUTPATIENT)
Dept: GENERAL RADIOLOGY | Facility: HOSPITAL | Age: 84
DRG: 871 | End: 2019-09-12
Attending: INTERNAL MEDICINE
Payer: MEDICARE

## 2019-09-12 LAB
BUN BLD-MCNC: 18 MG/DL (ref 7–18)
CALCIUM BLD-MCNC: 8.2 MG/DL (ref 8.5–10.1)
CHLORIDE SERPL-SCNC: 102 MMOL/L (ref 98–112)
CO2 SERPL-SCNC: 33 MMOL/L (ref 21–32)
CREAT BLD-MCNC: 1.34 MG/DL (ref 0.7–1.3)
DEPRECATED RDW RBC AUTO: 47.7 FL (ref 35.1–46.3)
ERYTHROCYTE [DISTWIDTH] IN BLOOD BY AUTOMATED COUNT: 13.7 % (ref 11–15)
GLUCOSE BLD-MCNC: 112 MG/DL (ref 70–99)
HAV IGM SER QL: 2.4 MG/DL (ref 1.6–2.6)
HCT VFR BLD AUTO: 33.4 % (ref 39–53)
HGB BLD-MCNC: 10.8 G/DL (ref 13–17.5)
MCH RBC QN AUTO: 31 PG (ref 26–34)
MCHC RBC AUTO-ENTMCNC: 32.3 G/DL (ref 31–37)
MCV RBC AUTO: 96 FL (ref 80–100)
PLATELET # BLD AUTO: 156 10(3)UL (ref 150–450)
POTASSIUM SERPL-SCNC: 3.7 MMOL/L (ref 3.5–5.1)
RBC # BLD AUTO: 3.48 X10(6)UL (ref 3.8–5.8)
SODIUM SERPL-SCNC: 140 MMOL/L (ref 136–145)
WBC # BLD AUTO: 5.9 X10(3) UL (ref 4–11)

## 2019-09-12 PROCEDURE — 71046 X-RAY EXAM CHEST 2 VIEWS: CPT | Performed by: INTERNAL MEDICINE

## 2019-09-12 PROCEDURE — 99232 SBSQ HOSP IP/OBS MODERATE 35: CPT | Performed by: INTERNAL MEDICINE

## 2019-09-12 PROCEDURE — 71045 X-RAY EXAM CHEST 1 VIEW: CPT | Performed by: INTERNAL MEDICINE

## 2019-09-12 RX ORDER — FUROSEMIDE 10 MG/ML
40 INJECTION INTRAMUSCULAR; INTRAVENOUS EVERY 8 HOURS
Status: COMPLETED | OUTPATIENT
Start: 2019-09-12 | End: 2019-09-12

## 2019-09-12 RX ORDER — ALBUMIN (HUMAN) 12.5 G/50ML
25 SOLUTION INTRAVENOUS EVERY 8 HOURS
Status: COMPLETED | OUTPATIENT
Start: 2019-09-12 | End: 2019-09-12

## 2019-09-12 RX ORDER — POTASSIUM CHLORIDE 14.9 MG/ML
20 INJECTION INTRAVENOUS ONCE
Status: COMPLETED | OUTPATIENT
Start: 2019-09-12 | End: 2019-09-12

## 2019-09-12 NOTE — PLAN OF CARE
Received report and assumed care of pt at 0730. Pt oriented to self, month, disoriented otherwise. Pt denies pain at this time. Pt on room air, VSS. Pt up in recliner, up to commode with standby assist, uses front wheel walker.  [address] cath applied; respondi

## 2019-09-12 NOTE — PROGRESS NOTES
Placed pt on Bipap overnight. Tolerated well with no changes made. Nebs given mask CPAP or through bipap as ordered. Will continue to monitor.        09/12/19 0514   BiPAP   $ RT Standby Charge (per 15 min) 1   Device V60   BiPAP / CPAP CE# 8576   Mode Spon

## 2019-09-12 NOTE — PLAN OF CARE
Pt confused and forgetful, oriented to self and place at times. L pupil irregular and nonreactive, Roman Marquez APN aware, no new orders. Maintaining adequate O2 saturations, on BIPAP at night. Denies pain.  Up to bedside commode with assistance, tolerated wel

## 2019-09-12 NOTE — PROGRESS NOTES
[facility]  Progress Note     Lena Clemente Patient Status:  Inpatient    1929 MRN [id number]   [facility] 4SW-A Attending Chaim Alston DO   Hosp Day # 6 PCP Beckie Alvarado DO     Chief Complaint: Cough    S: Patient    u * 228* 171*  --  138* 120* 112*   BUN 30* 21* 14  --  13 13 18   CREATSERUM 1.15 1.05 0.93  --  1.02 1.24 1.34*   GFRAA 65 72 84  --  75 59* 54*   GFRNAA 56* 63 73  --  65 51* 47*   CA 8.2* 8.2* 7.7*  --  8.0* 8.2* 8.2*   ALB 2.6* 2.5* 2.2*  -- following   2. Calorie count  3.  monitor while eating, up in chair   4. Spirits are better today is eating  4. Recurrent Right pleural effusion s/p thora x 3 ducub films  today   5. Trop elevation - resolved  1. Likely demand ischemia/NSTEMI Type II  6.  C

## 2019-09-12 NOTE — DIETARY NOTE
Nutrition Short Note  3 day calorie count started by MD - envelope hanging on door and will be monitored daily by RD. Pt ate 100% of last 3 meals.      Date: 9/11/19  Breakfast: 240 calories, 3 grams protein  Lunch: 350 calories, 12 grams protein  Dinner:

## 2019-09-13 ENCOUNTER — APPOINTMENT (OUTPATIENT)
Dept: GENERAL RADIOLOGY | Facility: HOSPITAL | Age: 84
DRG: 871 | End: 2019-09-13
Attending: INTERNAL MEDICINE
Payer: MEDICARE

## 2019-09-13 LAB
ALBUMIN SERPL-MCNC: 3.2 G/DL (ref 3.4–5)
ALBUMIN/GLOB SERPL: 1 {RATIO} (ref 1–2)
ALP LIVER SERPL-CCNC: 74 U/L (ref 45–117)
ALT SERPL-CCNC: 23 U/L (ref 16–61)
ANION GAP SERPL CALC-SCNC: 6 MMOL/L (ref 0–18)
AST SERPL-CCNC: 19 U/L (ref 15–37)
BILIRUB SERPL-MCNC: 0.5 MG/DL (ref 0.1–2)
BUN BLD-MCNC: 18 MG/DL (ref 7–18)
BUN/CREAT SERPL: 12.5 (ref 10–20)
CALCIUM BLD-MCNC: 8.9 MG/DL (ref 8.5–10.1)
CHLORIDE SERPL-SCNC: 102 MMOL/L (ref 98–112)
CO2 SERPL-SCNC: 31 MMOL/L (ref 21–32)
CREAT BLD-MCNC: 1.44 MG/DL (ref 0.7–1.3)
GLOBULIN PLAS-MCNC: 3.3 G/DL (ref 2.8–4.4)
GLUCOSE BLD-MCNC: 104 MG/DL (ref 70–99)
HAV IGM SER QL: 2.3 MG/DL (ref 1.6–2.6)
M PROTEIN MFR SERPL ELPH: 6.5 G/DL (ref 6.4–8.2)
OSMOLALITY SERPL CALC.SUM OF ELEC: 290 MOSM/KG (ref 275–295)
PHOSPHATE SERPL-MCNC: 3.3 MG/DL (ref 2.5–4.9)
POTASSIUM SERPL-SCNC: 3.4 MMOL/L (ref 3.5–5.1)
SODIUM SERPL-SCNC: 139 MMOL/L (ref 136–145)

## 2019-09-13 PROCEDURE — 71045 X-RAY EXAM CHEST 1 VIEW: CPT | Performed by: INTERNAL MEDICINE

## 2019-09-13 PROCEDURE — 99232 SBSQ HOSP IP/OBS MODERATE 35: CPT | Performed by: INTERNAL MEDICINE

## 2019-09-13 RX ORDER — POTASSIUM CHLORIDE 20 MEQ/1
40 TABLET, EXTENDED RELEASE ORAL EVERY 4 HOURS
Status: COMPLETED | OUTPATIENT
Start: 2019-09-13 | End: 2019-09-13

## 2019-09-13 NOTE — CM/SW NOTE
[facility] acceptance  DON completed

## 2019-09-13 NOTE — PROGRESS NOTES
Pt arrived to [facility]. AOx1, baseline. No c/o pain. Pt states he is just sleepy. VSS, on RA. No family at bedside. Will continue to monitor.

## 2019-09-13 NOTE — PROGRESS NOTES
[facility]  Progress Note    Orlie Antoine Patient Status:  Inpatient    1929 MRN [id number]   [facility] 3NE-A Attending Naseem Alexander DO   Hosp Day # 7 PCP Madalyn Crouch DO     Subjective:  Bob Schultz is a(n) 80 year o Recent Labs   Lab 09/10/19  0406 09/12/19  0440 09/13/19  0545   MG 1.9 2.4 2.3       Lab Results   Component Value Date    PHOS 3.3 09/13/2019        No results for input(s): PT, INR, PTT in the last 168 hours.     Recent Labs   Lab 09/09/19  7577 Q4H PRN   ipratropium-albuterol (DUONEB) nebulizer solution 3 mL 3 mL Nebulization 6 times per day   Piperacillin Sod-Tazobactam So (ZOSYN) 3.375 g in dextrose 5 % 100 mL ADD-vantage 3.375 g Intravenous Q8H   Normal Saline Flush 0.9 % injection 10 mL 10 mL

## 2019-09-13 NOTE — PROGRESS NOTES
[facility]  Progress Note     Orlie Meals Patient Status:  Inpatient    1929 MRN [id number]   [facility] 4SW-A Attending Naseem Alexander DO   Hosp Day # 7 PCP Madalyn Crouch DO     Chief Complaint: Cough    S: Patient    U < > 139 140 139   K 3.5 3.1*   < > 3.9 3.7 3.4*    110   < > 102 102 102   CO2 25.0 26.0   < > 32.0 33.0* 31.0   ALKPHO 74 61  --   --   --  74   AST 30 21  --   --   --  19   ALT 32 27  --   --   --  23   BILT 0.7 0.8  --   --   --  0.5   TP 5.9* 5 will be able to transport him to [facility] this weekend.       Quality:  · DVT Prophylaxis: Lovenox  · CODE status: DNR  · Brar: yes   Dc 9/11   · Central line: yes PICC    Will the patient be referred to [facility]CC on discharge?: tbd  Estimated date of discharge:

## 2019-09-13 NOTE — PLAN OF CARE
Assumed care at [address] [place] [address]. Pt is A&O 3. On RA, . BIPAP at night. 02 sats in the 90s. A fib on tele, A and V paced. Voids via urinal, incontinent at times. Briefed. Upx1 assist.   Receives IV lasix. Chopped diet, honey thickened liquids.  Takes p

## 2019-09-13 NOTE — OCCUPATIONAL THERAPY NOTE
OCCUPATIONAL THERAPY EVALUATION - INPATIENT     Room Number: 5091/4656-P  Evaluation Date: 9/13/2019  Type of Evaluation: Initial  Presenting Problem: respiratory failure, rhinovirus, pneumonia, septic shock    Physician Order: IP Consult to Occupational T toilet;Grab bar  Shower/Tub and Equipment: Walk-in shower;Grab bar; Shower chair  Other Equipment: (RW)          Drives: No       Prior Level of Function: lives at [facility]. Independent with ADL.  Pt has assistance with showering clothing?: A Lot  -   Bathing (including washing, rinsing, drying)?: A Lot  -   Toileting, which includes using toilet, bedpan or urinal? : A Lot  -   Putting on and taking off regular upper body clothing?: A Little  -   Taking care of personal grooming coyne ADL.      The patient is functioning below his previous functional level and would benefit from skilled inpatient OT to address the above deficits, maximizing patient’s ability to return safely to his prior level of function.     Patient Complexity  Occupat

## 2019-09-13 NOTE — PLAN OF CARE
Patient alert and oriented x3. Anxious and confused at times. Apaced on tele. Patient complain of generalized pain. PRN tylenol. Pt complains of cough. PRN robitussin. Droplet precautions. IV zosyn. Potassium replaced as per protocol.   Resting com

## 2019-09-14 ENCOUNTER — EXTERNAL FACILITY (OUTPATIENT)
Dept: FAMILY MEDICINE CLINIC | Facility: CLINIC | Age: 84
End: 2019-09-14

## 2019-09-14 ENCOUNTER — APPOINTMENT (OUTPATIENT)
Dept: GENERAL RADIOLOGY | Facility: HOSPITAL | Age: 84
DRG: 871 | End: 2019-09-14
Attending: INTERNAL MEDICINE
Payer: MEDICARE

## 2019-09-14 VITALS
BODY MASS INDEX: 21.49 KG/M2 | WEIGHT: 150.13 LBS | RESPIRATION RATE: 18 BRPM | HEART RATE: 55 BPM | DIASTOLIC BLOOD PRESSURE: 49 MMHG | OXYGEN SATURATION: 95 % | SYSTOLIC BLOOD PRESSURE: 108 MMHG | HEIGHT: 70 IN | TEMPERATURE: 97 F

## 2019-09-14 DIAGNOSIS — R77.8 ELEVATED TROPONIN: ICD-10-CM

## 2019-09-14 DIAGNOSIS — R65.21 SEPTIC SHOCK (HCC): ICD-10-CM

## 2019-09-14 DIAGNOSIS — I50.9 CHF WITH UNKNOWN LVEF (HCC): Primary | ICD-10-CM

## 2019-09-14 DIAGNOSIS — A41.9 SEPTIC SHOCK (HCC): ICD-10-CM

## 2019-09-14 DIAGNOSIS — F02.80 LATE ONSET ALZHEIMER'S DISEASE WITHOUT BEHAVIORAL DISTURBANCE (HCC): ICD-10-CM

## 2019-09-14 DIAGNOSIS — R13.10 DYSPHAGIA, UNSPECIFIED TYPE: ICD-10-CM

## 2019-09-14 DIAGNOSIS — J44.9 CHRONIC OBSTRUCTIVE PULMONARY DISEASE, UNSPECIFIED COPD TYPE (HCC): ICD-10-CM

## 2019-09-14 DIAGNOSIS — J15.9 PNEUMONIA, BACTERIAL: ICD-10-CM

## 2019-09-14 DIAGNOSIS — G30.1 LATE ONSET ALZHEIMER'S DISEASE WITHOUT BEHAVIORAL DISTURBANCE (HCC): ICD-10-CM

## 2019-09-14 LAB
ANION GAP SERPL CALC-SCNC: 4 MMOL/L (ref 0–18)
BUN BLD-MCNC: 18 MG/DL (ref 7–18)
BUN/CREAT SERPL: 17.6 (ref 10–20)
CALCIUM BLD-MCNC: 8.8 MG/DL (ref 8.5–10.1)
CHLORIDE SERPL-SCNC: 105 MMOL/L (ref 98–112)
CO2 SERPL-SCNC: 30 MMOL/L (ref 21–32)
CREAT BLD-MCNC: 1.02 MG/DL (ref 0.7–1.3)
GLUCOSE BLD-MCNC: 94 MG/DL (ref 70–99)
OSMOLALITY SERPL CALC.SUM OF ELEC: 290 MOSM/KG (ref 275–295)
POTASSIUM SERPL-SCNC: 3.9 MMOL/L (ref 3.5–5.1)
SODIUM SERPL-SCNC: 139 MMOL/L (ref 136–145)

## 2019-09-14 PROCEDURE — 99306 1ST NF CARE HIGH MDM 50: CPT | Performed by: FAMILY MEDICINE

## 2019-09-14 PROCEDURE — 99239 HOSP IP/OBS DSCHRG MGMT >30: CPT | Performed by: INTERNAL MEDICINE

## 2019-09-14 PROCEDURE — 71045 X-RAY EXAM CHEST 1 VIEW: CPT | Performed by: INTERNAL MEDICINE

## 2019-09-14 NOTE — RESPIRATORY THERAPY NOTE
Received patient on RA, maintaining sats>96%. Tolerates Q4 MCPAP Duoneb treatments well. Patient remained off BiPAP overnight and tolerated well, resting comfortably without any event. Continue nebs as ordered. Continue to monitor.

## 2019-09-14 NOTE — CM/SW NOTE
09/14/19 1000   Discharge disposition   Expected discharge disposition Skilled Nurs   [place] [id number] [address]   Discharge transportation Private car     Pt accepted at [facility] for admission today.   Daughter will transport a

## 2019-09-14 NOTE — PROGRESS NOTES
NURSING DISCHARGE NOTE    Discharged Rehab facility via Wheelchair. Accompanied by Family member and Support staff  Belongings Taken by patient/family. Discharge paperwork discussed with patient and daughter. All questions answered.  IV and PICC rem

## 2019-09-14 NOTE — CM/SW NOTE
Updates sent to [address] [place] [address] -await response on acceptance for today. Chanell Power, [facility] Worker/Dischage Planner  [phone]  Pager [phone]

## 2019-09-14 NOTE — DISCHARGE SUMMARY
[facility]  DISCHARGE SUMMARY     Jodie Tavarez Patient Status:  Inpatient    1929 MRN [id number]   [facility] 3NE-A Attending Marybel Raza DO   Hosp Day # 8 PCP Lynsey Gaspar DO     Date of Admission: 2019  Date of off supplemental oxygen.  Antibx completed and discharge to [place]. Guru    Procedures during hospitalization:   • PICC line placement/removal    Incidental or significant findings and recommendations (brief descriptions):  • D/c diuretics on d/c because of l °C)  Pulse:  [55-79] 55  Resp:  [18] 18  BP: ()/(49-60) 108/49    Physical Exam:    General: No acute distress. Respiratory: Clear to auscultation bilaterally. No wheezes. No rhonchi. Cardiovascular: S1, S2. Regular rate and rhythm.  No murmurs, ru

## 2019-09-14 NOTE — PLAN OF CARE
Resumed care of pt. At [address]. Pt. Is Ax3, resting in chair, calm, and cooperative.   Droplet precautions maintained  L arm precautions-PICC    No BiPap tonight per MD-current O2 stat at 96% on RA          Problem: CARDIOVASCULAR - ADULT  Goal: Maintains opt retention  Outcome: Progressing     Problem: GENITOURINARY - ADULT  Goal: Absence of urinary retention  Description  INTERVENTIONS:  - Assess patient’s ability to void and empty bladder  - Monitor intake/output and perform bladder scan as needed  - Follow and sodium.  Initiate appropriate interventions as ordered  Outcome: Progressing     Problem: Impaired Swallowing  Goal: Minimize aspiration risk  Description  Interventions:  - NPO  - frequent oral care to maintain oral hydration  - VFSS planned   Outcome:

## 2019-09-14 NOTE — PHYSICAL THERAPY NOTE
PHYSICAL THERAPY TREATMENT NOTE - INPATIENT    Room Number: 3528     Session: 2  Number of Visits to Meet Established Goals: 5    Presenting Problem: probable PNA, HF septic shock     History related to current admission: Admitted with URI symptoms x 2 da PAIN ASSESSMENT   Rating: 10(\"no big deal but it hurts\")  Location: R hip and leg to knee  Management Techniques: Activity promotion;Repositioning;Relaxation    BALANCE       Static Sitting: Fair  Dynamic Sitting: Fair -           Static Standing:  Fa SBA.    THERAPEUTIC EXERCISES  Lower Extremity Alternating marching  Hip AB/AD  Heel raises  LAQ  Toe raises     Upper Extremity      Position Sitting     Repetitions   20   Sets   1     Patient End of Session: Up in chair;Needs met;Call light within reach on 9/9/2019; goals in progress 9/14/2019

## 2019-09-15 ENCOUNTER — MOBILE ENCOUNTER (OUTPATIENT)
Dept: FAMILY MEDICINE CLINIC | Facility: CLINIC | Age: 84
End: 2019-09-15

## 2019-09-15 DIAGNOSIS — G30.1 LATE ONSET ALZHEIMER'S DISEASE WITHOUT BEHAVIORAL DISTURBANCE (HCC): ICD-10-CM

## 2019-09-15 DIAGNOSIS — R53.1 GENERALIZED WEAKNESS: ICD-10-CM

## 2019-09-15 DIAGNOSIS — A41.9 SEPTIC SHOCK (HCC): ICD-10-CM

## 2019-09-15 DIAGNOSIS — R77.8 ELEVATED TROPONIN: ICD-10-CM

## 2019-09-15 DIAGNOSIS — J90 PLEURAL EFFUSION: ICD-10-CM

## 2019-09-15 DIAGNOSIS — I50.9 CHF WITH UNKNOWN LVEF (HCC): Primary | ICD-10-CM

## 2019-09-15 DIAGNOSIS — F02.80 LATE ONSET ALZHEIMER'S DISEASE WITHOUT BEHAVIORAL DISTURBANCE (HCC): ICD-10-CM

## 2019-09-15 DIAGNOSIS — R13.10 DYSPHAGIA, UNSPECIFIED TYPE: ICD-10-CM

## 2019-09-15 DIAGNOSIS — J15.9 PNEUMONIA, BACTERIAL: ICD-10-CM

## 2019-09-15 DIAGNOSIS — R65.21 SEPTIC SHOCK (HCC): ICD-10-CM

## 2019-09-15 DIAGNOSIS — J44.9 CHRONIC OBSTRUCTIVE PULMONARY DISEASE, UNSPECIFIED COPD TYPE (HCC): ICD-10-CM

## 2019-09-15 PROCEDURE — 99358 PROLONG SERVICE W/O CONTACT: CPT | Performed by: FAMILY MEDICINE

## 2019-09-15 NOTE — PROGRESS NOTES
Juan C Chandler Author: Ephraim Solitario MD     1929 MRN [id number]   [facility]  Admission 19      Last Hospital Discharge 19 PCP Bruna Mccabe, [address] [facility] of Discharge  [facility]        CC --admitted to [address] [place] [address] Never Used      Tobacco comment: quit 40 yrs ago    Alcohol use:  Yes      Alcohol/week: 1.0 standard drinks      Types: 1 Glasses of wine per week      Frequency: Monthly or less      Drinks per session: 1 or 2      Binge frequency: Never    Drug use: Michaelle Benitez pressure 110/52  PHYSICAL EXAM:  GENERAL: well developed, well nourished, in no apparent distress  SKIN: no rashes, no suspicious lesions  Wound;   HEENT: atraumatic, normocephalic, ears and throat are clear  EYES: PERRLA, EOMI, conjunctiva are clear  NECK: type    Chronic obstructive pulmonary disease, unspecified COPD type ([facility]      Plan at this time is to admit the patient for subacute rehab  Physical therapy/Occupational Therapy/speech therapy evaluation and treatment  Aspiration precautions  Hospital me

## 2019-09-15 NOTE — PROGRESS NOTES
Gila Councilman Author: Ameena Mckenzie MD     1929 MRN [id number]   [facility]  Admission 19      Last Hospital Discharge 19 PCP Twana Severe, [address] [facility] of Discharge  [facility]       Patient admitted to [address] [place] [address]

## 2019-09-17 ENCOUNTER — INITIAL APN SNF VISIT (OUTPATIENT)
Dept: INTERNAL MEDICINE CLINIC | Age: 84
End: 2019-09-17

## 2019-09-17 DIAGNOSIS — M79.672 ARCH PAIN OF LEFT FOOT: ICD-10-CM

## 2019-09-17 DIAGNOSIS — Z86.19 HISTORY OF SEPSIS: ICD-10-CM

## 2019-09-17 DIAGNOSIS — Z87.01 HISTORY OF PNEUMONIA: ICD-10-CM

## 2019-09-17 DIAGNOSIS — Z79.899 MEDICATION MANAGEMENT: ICD-10-CM

## 2019-09-17 DIAGNOSIS — R53.81 PHYSICAL DECONDITIONING: Primary | ICD-10-CM

## 2019-09-17 PROCEDURE — 99310 SBSQ NF CARE HIGH MDM 45: CPT | Performed by: NURSE PRACTITIONER

## 2019-09-19 VITALS
RESPIRATION RATE: 20 BRPM | OXYGEN SATURATION: 96 % | HEART RATE: 67 BPM | SYSTOLIC BLOOD PRESSURE: 98 MMHG | DIASTOLIC BLOOD PRESSURE: 50 MMHG | TEMPERATURE: 98 F

## 2019-09-19 RX ORDER — ACETAMINOPHEN 325 MG/1
650 TABLET ORAL EVERY 6 HOURS PRN
COMMUNITY

## 2019-09-20 ENCOUNTER — SNF VISIT (OUTPATIENT)
Dept: INTERNAL MEDICINE CLINIC | Age: 84
End: 2019-09-20

## 2019-09-20 DIAGNOSIS — Z87.01 HISTORY OF PNEUMONIA: ICD-10-CM

## 2019-09-20 DIAGNOSIS — M79.672 ARCH PAIN OF LEFT FOOT: ICD-10-CM

## 2019-09-20 DIAGNOSIS — R53.81 PHYSICAL DECONDITIONING: Primary | ICD-10-CM

## 2019-09-20 PROCEDURE — 99307 SBSQ NF CARE SF MDM 10: CPT | Performed by: NURSE PRACTITIONER

## 2019-09-20 NOTE — PROGRESS NOTES
Samuel Butler  : 1929  Age 80year old  male patient is admitted to Facility: [address] [place] [address] 140 Residence for [address] [place] [address] date:  19  Discharge date to [facility]:  19  ELOS:  11-13 days  Anticipated discharge date:  19  Tom Blanca successfully answer the question, Judy Beebe would you do in the event that nobody was by you and you needed help/assistance? \"  -By answering, push the call button for help. Call light within reach.     Past Medical History:   Diagnosis Date   • Anxiety state 250-50 MCG/DOSE Inhalation Aerosol Powder, Breath Activated Inhale 1 puff into the lungs every 12 (twelve) hours. Disp:  Rfl:    aspirin 81 MG Oral Tab Take 81 mg by mouth daily.  Disp:  Rfl:    Montelukast Sodium 10 MG Oral Tab Take 1 tablet (10 mg total) auscultation  CARDIOVASCULAR: S1, S2 normal, RRR; no S3, no S4;   ABDOMEN: active BS+, soft, nondistended; no visible masses; nontender, no guarding  :Deferred  MUSCULOSKELETAL: weakness r/t recent hospitalization/diagnoses/sequelae; will undergo therapi worker & discharge planner to assist with establishing safe discharge plan for next level of care    Hx septic shock, acute respiratory failure, pleural effusion, PNA; completed abx course at hospital   VS q shift  Weekly labs; more often as clinically ind

## 2019-09-24 ENCOUNTER — SNF VISIT (OUTPATIENT)
Dept: INTERNAL MEDICINE CLINIC | Age: 84
End: 2019-09-24

## 2019-09-24 DIAGNOSIS — Z79.899 MEDICATION MANAGEMENT: ICD-10-CM

## 2019-09-24 DIAGNOSIS — Z87.01 HISTORY OF PNEUMONIA: ICD-10-CM

## 2019-09-24 DIAGNOSIS — M79.672 ARCH PAIN OF LEFT FOOT: ICD-10-CM

## 2019-09-24 DIAGNOSIS — Z76.0 PRESCRIPTION REFILL: ICD-10-CM

## 2019-09-24 DIAGNOSIS — R53.81 PHYSICAL DECONDITIONING: Primary | ICD-10-CM

## 2019-09-24 PROCEDURE — 99316 NF DSCHRG MGMT 30 MIN+: CPT | Performed by: NURSE PRACTITIONER

## 2019-09-25 VITALS
DIASTOLIC BLOOD PRESSURE: 54 MMHG | OXYGEN SATURATION: 96 % | HEART RATE: 64 BPM | TEMPERATURE: 97 F | RESPIRATION RATE: 18 BRPM | SYSTOLIC BLOOD PRESSURE: 112 MMHG

## 2019-09-25 VITALS
TEMPERATURE: 98 F | DIASTOLIC BLOOD PRESSURE: 50 MMHG | OXYGEN SATURATION: 95 % | HEART RATE: 68 BPM | RESPIRATION RATE: 18 BRPM | SYSTOLIC BLOOD PRESSURE: 113 MMHG

## 2019-09-26 NOTE — PROGRESS NOTES
Navjot Ngo, 12/23/1929, 80year old, male    Chief Complaint:  Patient presents with:   Follow - Up  Weakness  Musculoskeletal Problem: left foot arch pain/follow-up     [facility]:  9/5/19  Discharge date to [facility]  9/14/19  ELOS:  11-13 affect appropriate, pleasant    Medications reviewed: Yes    Diagnostics reviewed:  None today    Assessment and plan:  Physical Deconditioning/Weakness  PT/OT/ST to evaluate and treat  JOSE L team to establish care plan meeting with patient and POA/family as

## 2019-09-26 NOTE — PROGRESS NOTES
Lawrence Wilson, 12/23/1929, 80year old, male is being discharged from Facility: [place] [address]    Date of Admission: 9/14/19    Date of Discharge: 9/27/19                                Admitting Diagnoses:  Weakness, PNA follow-up with cardiology; med list with BP/HRs faxed to Dr. Reginald Del Angel for review and recommendations.  -Staff have been holding Metoprolol BID due to SBP ~90; decreased dose from 25mg po BID to 12.5mg po daily. Low-dose Lisinopril.   · He will need kinesio t

## 2019-09-28 ENCOUNTER — APPOINTMENT (OUTPATIENT)
Dept: GENERAL RADIOLOGY | Facility: HOSPITAL | Age: 84
DRG: 291 | End: 2019-09-28
Attending: EMERGENCY MEDICINE
Payer: MEDICARE

## 2019-09-28 ENCOUNTER — HOSPITAL ENCOUNTER (INPATIENT)
Facility: HOSPITAL | Age: 84
LOS: 3 days | Discharge: HOME HEALTH | DRG: 291 | End: 2019-10-01
Attending: EMERGENCY MEDICINE | Admitting: HOSPITALIST
Payer: MEDICARE

## 2019-09-28 DIAGNOSIS — J81.0 ACUTE PULMONARY EDEMA (HCC): ICD-10-CM

## 2019-09-28 DIAGNOSIS — R06.03 RESPIRATORY DISTRESS: Primary | ICD-10-CM

## 2019-09-28 DIAGNOSIS — R09.02 HYPOXIA: ICD-10-CM

## 2019-09-28 DIAGNOSIS — I50.33 ACUTE ON CHRONIC HEART FAILURE WITH PRESERVED EJECTION FRACTION (HFPEF) (HCC): ICD-10-CM

## 2019-09-28 PROBLEM — I10 ESSENTIAL HYPERTENSION: Chronic | Status: ACTIVE | Noted: 2019-09-28

## 2019-09-28 PROBLEM — J44.1 CHRONIC OBSTRUCTIVE PULMONARY DISEASE WITH ACUTE EXACERBATION (HCC): Status: ACTIVE | Noted: 2019-09-28

## 2019-09-28 LAB
ALBUMIN SERPL-MCNC: 3.4 G/DL (ref 3.4–5)
ALBUMIN/GLOB SERPL: 0.8 {RATIO} (ref 1–2)
ALLENS TEST: POSITIVE
ALP LIVER SERPL-CCNC: 134 U/L (ref 45–117)
ALT SERPL-CCNC: 34 U/L (ref 16–61)
ANION GAP SERPL CALC-SCNC: 8 MMOL/L (ref 0–18)
APTT PPP: 28.5 SECONDS (ref 25.4–36.1)
ARTERIAL BLD GAS O2 SATURATION: 93 % (ref 92–100)
ARTERIAL BLOOD GAS BASE EXCESS: -4 MMOL/L (ref ?–2)
ARTERIAL BLOOD GAS HCO3: 20.3 MEQ/L (ref 22–26)
ARTERIAL BLOOD GAS PCO2: 34 MM HG (ref 35–45)
ARTERIAL BLOOD GAS PH: 7.39 (ref 7.35–7.45)
ARTERIAL BLOOD GAS PO2: 66 MM HG (ref 80–105)
AST SERPL-CCNC: 48 U/L (ref 15–37)
BASOPHILS # BLD AUTO: 0.02 X10(3) UL (ref 0–0.2)
BASOPHILS NFR BLD AUTO: 0.2 %
BILIRUB SERPL-MCNC: 0.5 MG/DL (ref 0.1–2)
BILIRUB UR QL STRIP.AUTO: NEGATIVE
BUN BLD-MCNC: 24 MG/DL (ref 7–18)
BUN/CREAT SERPL: 19.8 (ref 10–20)
CALCIUM BLD-MCNC: 8.8 MG/DL (ref 8.5–10.1)
CALCULATED O2 SATURATION: 93 % (ref 92–100)
CARBOXYHEMOGLOBIN: 1.6 % SAT (ref 0–3)
CHLORIDE SERPL-SCNC: 109 MMOL/L (ref 98–112)
CLARITY UR REFRACT.AUTO: CLEAR
CO2 SERPL-SCNC: 23 MMOL/L (ref 21–32)
CPAP: 6 CM H2O
CREAT BLD-MCNC: 1.21 MG/DL (ref 0.7–1.3)
DEPRECATED RDW RBC AUTO: 55.4 FL (ref 35.1–46.3)
EOSINOPHIL # BLD AUTO: 0.1 X10(3) UL (ref 0–0.7)
EOSINOPHIL NFR BLD AUTO: 0.8 %
ERYTHROCYTE [DISTWIDTH] IN BLOOD BY AUTOMATED COUNT: 15 % (ref 11–15)
FIO2: 30 %
GLOBULIN PLAS-MCNC: 4.1 G/DL (ref 2.8–4.4)
GLUCOSE BLD-MCNC: 142 MG/DL (ref 70–99)
GLUCOSE UR STRIP.AUTO-MCNC: NEGATIVE MG/DL
HCT VFR BLD AUTO: 39.9 % (ref 39–53)
HGB BLD-MCNC: 12.6 G/DL (ref 13–17.5)
IMM GRANULOCYTES # BLD AUTO: 0.04 X10(3) UL (ref 0–1)
IMM GRANULOCYTES NFR BLD: 0.3 %
INR BLD: 1.08 (ref 0.9–1.1)
IONIZED CALCIUM: 1.16 MMOL/L (ref 1.12–1.32)
KETONES UR STRIP.AUTO-MCNC: NEGATIVE MG/DL
LACTIC ACID ARTERIAL: 1.7 MMOL/L (ref 0.5–2)
LEUKOCYTE ESTERASE UR QL STRIP.AUTO: NEGATIVE
LYMPHOCYTES # BLD AUTO: 0.86 X10(3) UL (ref 1–4)
LYMPHOCYTES NFR BLD AUTO: 6.7 %
M PROTEIN MFR SERPL ELPH: 7.5 G/DL (ref 6.4–8.2)
MCH RBC QN AUTO: 31.7 PG (ref 26–34)
MCHC RBC AUTO-ENTMCNC: 31.6 G/DL (ref 31–37)
MCV RBC AUTO: 100.3 FL (ref 80–100)
METHEMOGLOBIN: 0.5 % SAT (ref 0.4–1.5)
MONOCYTES # BLD AUTO: 0.72 X10(3) UL (ref 0.1–1)
MONOCYTES NFR BLD AUTO: 5.6 %
NEUTROPHILS # BLD AUTO: 11.14 X10 (3) UL (ref 1.5–7.7)
NEUTROPHILS # BLD AUTO: 11.14 X10(3) UL (ref 1.5–7.7)
NEUTROPHILS NFR BLD AUTO: 86.4 %
NITRITE UR QL STRIP.AUTO: NEGATIVE
NT-PROBNP SERPL-MCNC: 2833 PG/ML (ref ?–450)
OSMOLALITY SERPL CALC.SUM OF ELEC: 296 MOSM/KG (ref 275–295)
P/F RATIO: 226.8 MMHG
PATIENT TEMPERATURE: 97.8 F
PH UR STRIP.AUTO: 5 [PH] (ref 4.5–8)
PLATELET # BLD AUTO: 174 10(3)UL (ref 150–450)
POTASSIUM BLOOD GAS: 4.5 MMOL/L (ref 3.6–5.1)
POTASSIUM SERPL-SCNC: 4.7 MMOL/L (ref 3.5–5.1)
PROT UR STRIP.AUTO-MCNC: NEGATIVE MG/DL
PSA SERPL DL<=0.01 NG/ML-MCNC: 14.5 SECONDS (ref 12.5–14.7)
RBC # BLD AUTO: 3.98 X10(6)UL (ref 3.8–5.8)
RBC UR QL AUTO: NEGATIVE
SODIUM BLOOD GAS: 141 MMOL/L (ref 136–144)
SODIUM SERPL-SCNC: 140 MMOL/L (ref 136–145)
SP GR UR STRIP.AUTO: 1.01 (ref 1–1.03)
TOTAL HEMOGLOBIN: 13 G/DL (ref 12.6–17.4)
TOTAL PEAK INSPIRATORY PRESSURE: 12 CM H2O
TROPONIN I SERPL-MCNC: <0.045 NG/ML (ref ?–0.04)
UROBILINOGEN UR STRIP.AUTO-MCNC: <2 MG/DL
WBC # BLD AUTO: 12.9 X10(3) UL (ref 4–11)

## 2019-09-28 PROCEDURE — 5A09457 ASSISTANCE WITH RESPIRATORY VENTILATION, 24-96 CONSECUTIVE HOURS, CONTINUOUS POSITIVE AIRWAY PRESSURE: ICD-10-PCS | Performed by: HOSPITALIST

## 2019-09-28 PROCEDURE — 99223 1ST HOSP IP/OBS HIGH 75: CPT | Performed by: HOSPITALIST

## 2019-09-28 PROCEDURE — 71045 X-RAY EXAM CHEST 1 VIEW: CPT | Performed by: EMERGENCY MEDICINE

## 2019-09-28 RX ORDER — FUROSEMIDE 10 MG/ML
40 INJECTION INTRAMUSCULAR; INTRAVENOUS ONCE
Status: COMPLETED | OUTPATIENT
Start: 2019-09-28 | End: 2019-09-28

## 2019-09-28 RX ORDER — ACETAMINOPHEN 325 MG/1
650 TABLET ORAL EVERY 6 HOURS PRN
Status: DISCONTINUED | OUTPATIENT
Start: 2019-09-28 | End: 2019-10-01

## 2019-09-28 RX ORDER — FUROSEMIDE 10 MG/ML
20 INJECTION INTRAMUSCULAR; INTRAVENOUS
Status: DISCONTINUED | OUTPATIENT
Start: 2019-09-29 | End: 2019-10-01

## 2019-09-28 RX ORDER — BISACODYL 10 MG
10 SUPPOSITORY, RECTAL RECTAL
Status: DISCONTINUED | OUTPATIENT
Start: 2019-09-28 | End: 2019-10-01

## 2019-09-28 RX ORDER — METOCLOPRAMIDE HYDROCHLORIDE 5 MG/ML
5 INJECTION INTRAMUSCULAR; INTRAVENOUS EVERY 8 HOURS PRN
Status: DISCONTINUED | OUTPATIENT
Start: 2019-09-28 | End: 2019-10-01

## 2019-09-28 RX ORDER — ALBUTEROL SULFATE 2.5 MG/3ML
2.5 SOLUTION RESPIRATORY (INHALATION)
Status: DISCONTINUED | OUTPATIENT
Start: 2019-09-28 | End: 2019-09-29

## 2019-09-28 RX ORDER — DOCUSATE SODIUM 100 MG/1
100 CAPSULE, LIQUID FILLED ORAL 2 TIMES DAILY
Status: DISCONTINUED | OUTPATIENT
Start: 2019-09-28 | End: 2019-10-01

## 2019-09-28 RX ORDER — METOPROLOL SUCCINATE 25 MG/1
25 TABLET, EXTENDED RELEASE ORAL
Status: DISCONTINUED | OUTPATIENT
Start: 2019-09-29 | End: 2019-09-29

## 2019-09-28 RX ORDER — SODIUM PHOSPHATE, DIBASIC AND SODIUM PHOSPHATE, MONOBASIC 7; 19 G/133ML; G/133ML
1 ENEMA RECTAL ONCE AS NEEDED
Status: DISCONTINUED | OUTPATIENT
Start: 2019-09-28 | End: 2019-10-01

## 2019-09-28 RX ORDER — POLYETHYLENE GLYCOL 3350 17 G/17G
17 POWDER, FOR SOLUTION ORAL DAILY PRN
Status: DISCONTINUED | OUTPATIENT
Start: 2019-09-28 | End: 2019-10-01

## 2019-09-28 RX ORDER — ENOXAPARIN SODIUM 100 MG/ML
40 INJECTION SUBCUTANEOUS DAILY
Status: DISCONTINUED | OUTPATIENT
Start: 2019-09-28 | End: 2019-10-01

## 2019-09-28 RX ORDER — ONDANSETRON 2 MG/ML
4 INJECTION INTRAMUSCULAR; INTRAVENOUS EVERY 6 HOURS PRN
Status: DISCONTINUED | OUTPATIENT
Start: 2019-09-28 | End: 2019-10-01

## 2019-09-28 RX ORDER — LISINOPRIL 2.5 MG/1
2.5 TABLET ORAL DAILY
Status: DISCONTINUED | OUTPATIENT
Start: 2019-09-29 | End: 2019-10-01

## 2019-09-28 NOTE — ED PROVIDER NOTES
Patient Seen in: [facility] Emergency Department      History   Patient presents with:  Dyspnea ASHLEY SOB (respiratory)    Stated Complaint:     HPI    Galen Gomez is a pleasant 80-year-old male whose history is obtained per patient but also this is limite • Pneumonia due to organism    • Shortness of breath    • Visual impairment               Past Surgical History:   Procedure Laterality Date   • BYPASS SURGERY      1990   • CABG  1982   • PACEMAKER/DEFIBRILLATOR  2010   • TOTAL HIP REPLACEMENT Right 201 Abnormal; Notable for the following components:       Result Value    Glucose 142 (*)     BUN 24 (*)     Calculated Osmolality 296 (*)     GFR, Non- 53 (*)     AST 48 (*)     Alkaline Phosphatase 134 (*)     A/G Ratio 0.8 (*)     All other critical care time (exclusive of billable procedures) was administered to manage the patient's respiratory instability due to his hypoxia.   This involved direct patient intervention, complex decision making, and/or extensive discussions with the patient, f

## 2019-09-28 NOTE — H&P
[facility]  History and Physical     Lieutenant Orn Patient Status:  Emergency    1929 MRN [id number]   Location [address] [facility] Attending Enriqueta Segura MD   Hosp Day # 0 PCP Leonardo Harrison DO     Chief Complain Heart Disorder Neg         Allergies:   Coumadin [Warfarin]     BLEEDING    Comment:GI bleed  Ephedrine               OTHER (SEE COMMENTS)    Comment:hyperactive    Medications:    No current facility-administered medications on file prior to encounter. pulses. Chest and Back: No tenderness or deformity. Abdomen: Soft, nontender, nondistended. Positive bowel sounds. No rebound, guarding or organomegaly. Neurologic: No focal neurological deficits. CNII-XII grossly intact.   Musculoskeletal: Moves all e

## 2019-09-28 NOTE — RESPIRATORY THERAPY NOTE
Called to ED to place patient on Bipap for dyspnea. Patient on mask cpap upon arrival, stated mask helped with the SOB. Placed patient on bipap 12/6 30%. RR 25 and sats 94% on these settings, patient stated he felt comfortable. ABG was drawn at this time.

## 2019-09-28 NOTE — ED INITIAL ASSESSMENT (HPI)
PT from the [facility]. PT called EMS for dyspnea. O2 sats in 70's on room air with rales for lung sounds. PT started on cpap, O2 sats at 100.

## 2019-09-29 ENCOUNTER — APPOINTMENT (OUTPATIENT)
Dept: CV DIAGNOSTICS | Facility: HOSPITAL | Age: 84
DRG: 291 | End: 2019-09-29
Attending: INTERNAL MEDICINE
Payer: MEDICARE

## 2019-09-29 LAB
ANION GAP SERPL CALC-SCNC: 7 MMOL/L (ref 0–18)
BASOPHILS # BLD AUTO: 0.01 X10(3) UL (ref 0–0.2)
BASOPHILS NFR BLD AUTO: 0.1 %
BUN BLD-MCNC: 26 MG/DL (ref 7–18)
BUN/CREAT SERPL: 21.8 (ref 10–20)
CALCIUM BLD-MCNC: 8.4 MG/DL (ref 8.5–10.1)
CHLORIDE SERPL-SCNC: 111 MMOL/L (ref 98–112)
CO2 SERPL-SCNC: 25 MMOL/L (ref 21–32)
CREAT BLD-MCNC: 1.19 MG/DL (ref 0.7–1.3)
DEPRECATED RDW RBC AUTO: 54.4 FL (ref 35.1–46.3)
EOSINOPHIL # BLD AUTO: 0.04 X10(3) UL (ref 0–0.7)
EOSINOPHIL NFR BLD AUTO: 0.5 %
ERYTHROCYTE [DISTWIDTH] IN BLOOD BY AUTOMATED COUNT: 15.6 % (ref 11–15)
GLUCOSE BLD-MCNC: 106 MG/DL (ref 70–99)
HCT VFR BLD AUTO: 30.7 % (ref 39–53)
HGB BLD-MCNC: 10.2 G/DL (ref 13–17.5)
IMM GRANULOCYTES # BLD AUTO: 0.03 X10(3) UL (ref 0–1)
IMM GRANULOCYTES NFR BLD: 0.4 %
LYMPHOCYTES # BLD AUTO: 0.56 X10(3) UL (ref 1–4)
LYMPHOCYTES NFR BLD AUTO: 6.7 %
MCH RBC QN AUTO: 33.9 PG (ref 26–34)
MCHC RBC AUTO-ENTMCNC: 33.2 G/DL (ref 31–37)
MCV RBC AUTO: 102 FL (ref 80–100)
MONOCYTES # BLD AUTO: 0.54 X10(3) UL (ref 0.1–1)
MONOCYTES NFR BLD AUTO: 6.5 %
NEUTROPHILS # BLD AUTO: 7.14 X10 (3) UL (ref 1.5–7.7)
NEUTROPHILS # BLD AUTO: 7.14 X10(3) UL (ref 1.5–7.7)
NEUTROPHILS NFR BLD AUTO: 85.8 %
OSMOLALITY SERPL CALC.SUM OF ELEC: 301 MOSM/KG (ref 275–295)
PLATELET # BLD AUTO: 112 10(3)UL (ref 150–450)
POTASSIUM SERPL-SCNC: 4.3 MMOL/L (ref 3.5–5.1)
RBC # BLD AUTO: 3.01 X10(6)UL (ref 3.8–5.8)
SODIUM SERPL-SCNC: 143 MMOL/L (ref 136–145)
WBC # BLD AUTO: 8.3 X10(3) UL (ref 4–11)

## 2019-09-29 PROCEDURE — 93306 TTE W/DOPPLER COMPLETE: CPT | Performed by: INTERNAL MEDICINE

## 2019-09-29 PROCEDURE — 99232 SBSQ HOSP IP/OBS MODERATE 35: CPT | Performed by: HOSPITALIST

## 2019-09-29 RX ORDER — ASPIRIN 81 MG/1
81 TABLET, CHEWABLE ORAL DAILY
Status: DISCONTINUED | OUTPATIENT
Start: 2019-09-29 | End: 2019-10-01

## 2019-09-29 RX ORDER — IPRATROPIUM BROMIDE AND ALBUTEROL SULFATE 2.5; .5 MG/3ML; MG/3ML
3 SOLUTION RESPIRATORY (INHALATION)
Status: DISCONTINUED | OUTPATIENT
Start: 2019-09-29 | End: 2019-10-01

## 2019-09-29 RX ORDER — ESCITALOPRAM OXALATE 10 MG/1
10 TABLET ORAL DAILY
Status: DISCONTINUED | OUTPATIENT
Start: 2019-09-29 | End: 2019-10-01

## 2019-09-29 RX ORDER — ALBUTEROL SULFATE 2.5 MG/3ML
2.5 SOLUTION RESPIRATORY (INHALATION)
Status: DISCONTINUED | OUTPATIENT
Start: 2019-09-29 | End: 2019-09-29

## 2019-09-29 RX ORDER — ALFUZOSIN HYDROCHLORIDE 10 MG/1
10 TABLET, EXTENDED RELEASE ORAL
Status: DISCONTINUED | OUTPATIENT
Start: 2019-09-29 | End: 2019-10-01

## 2019-09-29 RX ORDER — PRIMIDONE 50 MG/1
50 TABLET ORAL 3 TIMES DAILY
Status: DISCONTINUED | OUTPATIENT
Start: 2019-09-29 | End: 2019-10-01

## 2019-09-29 RX ORDER — MEMANTINE HYDROCHLORIDE 10 MG/1
10 TABLET ORAL 2 TIMES DAILY
Status: DISCONTINUED | OUTPATIENT
Start: 2019-09-29 | End: 2019-10-01

## 2019-09-29 RX ORDER — MONTELUKAST SODIUM 10 MG/1
10 TABLET ORAL NIGHTLY
Status: DISCONTINUED | OUTPATIENT
Start: 2019-09-29 | End: 2019-10-01

## 2019-09-29 NOTE — PROGRESS NOTES
09/28/19 2112   BiPAP   $ RT Standby Charge (per 15 min) 1   $ Vent Care / Non-Invasive Subsequent Day Yes   Device V60   Mode Spontaneous/Timed   Interface Full face mask   Mask Size Medium   Control Settings   Set Rate 14 breaths/min   Set IPAP 12   S

## 2019-09-29 NOTE — HISTORICAL OFFICE NOTE
Dianne Yusuf MD - 2019 4:00 PM CDT  Formatting of this note might be different from the original.  [address] Note    Alpa Leger  : 1929  PCP: Pcp Not In System    Reason for Consultation:  Chief Complaint 2009   • Cdl pacemaker generator replacement   pacemaker generator change ([employer]) 10/2013   • Coronary artery bypass graft   3 vessel CABG 1990   • Coronary artery bypass graft 1990   • Thoracentesis 12/2018, 2/2019, 3/2019   • Total hip replacement warm, well perfused, no edema  Skin: warm, dry    Labs:  No visits with results within 3 Month(s) from this visit.    Latest known visit with results is:   Orders Only on 12/17/2018   Component Date Value Ref Range Status   • PATH REPORT, CYTOLOGY 12/17/201 systolic, acute (CMS/HCC)   • Hx of CABG   • History of PTCA   • Pure hypercholesterolemia   • Pacemaker   • Fitting and adjustment of cardiac pacemaker   • Late onset Alzheimer's disease without behavioral disturbance   • Pleural effusion   • Postprocedur • Heart attack (CMS/HCC) 1985   • Hyperlipoproteinemia   • MI (myocardial infarction) (CMS/HCC)   • PAF (paroxysmal atrial fibrillation) (CMS/HCC)     Past Surgical History  Past Surgical History:   Procedure Laterality Date   • Anesth,pacemaker insertio suspicious lesions. Musculoskeletal: Negative for arthritis. Gastrointestinal: Negative for hematochezia and melena. Genitourinary: Negative for hematuria. Neurological:   No localized deficits   Psychiatric/Behavioral: Positive for memory loss. worsening in symptoms. Return to see Dr. Bret Levi as previously scheduled. Routine follow-up with pulmonary as directed.     Yomi Mccarthy CNP    Electronically Signed by León Benitez CNP on 03/14/2019 9:00 AM CDT

## 2019-09-29 NOTE — PLAN OF CARE
Problem: Impaired Swallowing  Goal: Minimize aspiration risk  Description  Interventions:  - Patient should be alert and upright for all feedings (90 degrees preferred)  - Offer food and liquids at a slow rate  - No straws  - Encourage small bites of larisa

## 2019-09-29 NOTE — SLP NOTE
ADULT SWALLOWING EVALUATION    ASSESSMENT    ASSESSMENT/OVERALL IMPRESSION:  Orders were received for a bedside swallowing evaluation as patient with a history of being on a modified diet.  Patient had been discharged from [facility] to [address] [place] [address] MEDICAL HISTORY  Reason for Referral: R/O aspiration(history of dysphagia)    Problem List  Principal Problem:    Respiratory distress  Active Problems:    Late onset Alzheimer's disease without behavioral disturbance ([facility]    Coronary artery disease inv swallow, decreased laryngeal elevation, decreased anterior hyoid excursion, decreased epiglottic movement, decreased laryngeal vestibular closure, decreased tongue base retraction, and decreased clearance of pharyngeal residue.  Pt is at risk for aspiration complaints consistent with possible esophageal involvement  Comments:               GOALS  Goal #1 The patient will tolerate regular consistency and nectar thick liquids without overt signs or symptoms of aspiration with 95 % accuracy over 1-2 session(s).

## 2019-09-29 NOTE — PROGRESS NOTES
[facility] Pharmacy Note:  Renal Dose Adjustment for Metoclopramide (REGLAN)    Marline Smith has been prescribed Metoclopramide (REGLAN) 10 mg every 8 hours as needed for nausea/vomiting.     Estimated Creatinine Clearance: 37.3 mL/min (based on SCr of 1.21 mg/d

## 2019-09-29 NOTE — PROGRESS NOTES
[facility]  Progress Note    Orinda Kehr Patient Status:  Inpatient    1929 MRN [id number]   [facility] 7NE-A Attending Sidney Simons [facility] Day # 1 PCP Sita Ramirez DO       Assessment and Plan:  Patient Activ kg)   SpO2 97%   BMI 25.42 kg/m²     Temp (24hrs), Av.3 °F (36.8 °C), Min:97.8 °F (36.6 °C), Max:98.7 °F (37.1 °C)      Intake/Output:    Intake/Output Summary (Last 24 hours) at 2019 1316  Last data filed at 2019 0141  Gross per 24 hour   In 1 puff 1 puff Inhalation Daily   Memantine HCl (NAMENDA) tab 10 mg 10 mg Oral BID   Montelukast Sodium (SINGULAIR) tab 10 mg 10 mg Oral Nightly   primidone (MYSOLINE) tab 50 mg 50 mg Oral TID   albuterol sulfate (VENTOLIN) (2.5 MG/3ML) 0.083% nebulizer sol

## 2019-09-29 NOTE — PROGRESS NOTES
[facility]  Progress Note     Trude Dies Patient Status:  Inpatient    1929 MRN [id number]   [facility] 7NE-A Attending [facility] Day # 1 PCP Marcela Lala DO     Chief Complaint: SOB    S: Anna Marie Lam TROP <0.045            Imaging: Imaging data reviewed in Epic.     Medications:   • aspirin  81 mg Oral Daily   • escitalopram  10 mg Oral Daily   • Fluticasone Furoate-Vilanterol  1 puff Inhalation Daily   • Memantine HCl  10 mg Oral BID   • Montelukast

## 2019-09-29 NOTE — CONSULTS
[facility]  Report of Consultation    Orlie Meals Patient Status:  Inpatient    1929 MRN [id number]   [facility] 7NE-A Attending Mayra GómezTERESA [facility] Day # 1 PCP Madalyn Crouch DO     Reason for Consultation: D Right 2015     Family History   Problem Relation Age of Onset   • Cancer Neg    • Heart Disorder Neg       reports that he has quit smoking. His smoking use included cigarettes. He has a 60.00 pack-year smoking history.  He has never used smokeless tobacco. -550 ml       Physical Exam:   General: alert, cooperative, mildly anxious, conf oriented x1. No respiratory distress. Head: Normocephalic, without obvious abnormality, atraumatic. Eyes: Conjunctivae/corneas clear. No scleral icterus.   No conjunctiva nature  COPD  Essential hypertension  Hyperlipidemia  Dementia  Peripheral vascular disease  History of peptic ulcer disease    Plan: Continue with diuresis. Follow serial bladder scans. Wean oxygen as able. Continue bronchopulmonary toilet measures.

## 2019-09-29 NOTE — CONSULTS
[facility]  Cardiology Consultation    Laura Peon Patient Status:  Inpatient    1929 MRN [id number]   [facility] 7NE-A Attending Nanette Barrera [facility] Day # 0 PCP Reinaldo Frazier, DO     Reason for Consultation:  D levocetirizine (XYZAL) 5 MG tablet Take 5 mg by mouth. • furosemide (LASIX) 40 MG tablet Take 40 mg by mouth daily. • citalopram (CELEXA) 20 MG tablet Take 20 mg by mouth daily.    • lisinopril (ZESTRIL) 2.5 MG tablet TAKE 1 TABLET BY MOUTH EVERY DAY 30 (36.8 °C), Min:97.8 °F (36.6 °C), Max:98.7 °F (37.1 °C)     No intake or output data in the 24 hours ending 09/28/19 8286  Wt Readings from Last 3 Encounters:  09/28/19 : 159 lb 3.2 oz (72.2 kg)  09/11/19 : 150 lb 2.1 oz (68.1 kg)  02/27/19 : 143 lb 4.8 oz Acute respiratory failure (HCC)    History of coronary artery bypass graft    Chronic obstructive pulmonary disease with acute exacerbation (HCC)    Recent hx of aspiration on video swallow. Recommendations:  1. Aspiration precautions  2.  Agree

## 2019-09-29 NOTE — PROGRESS NOTES
09/29/19 0350   BiPAP   $ RT Standby Charge (per 15 min) 1   $ Vent Care / Non-Invasive Subsequent Day Yes   Device V60   BiPAP / CPAP CE# [id number]   Mode Spontaneous/Timed   Interface Full face mask   Mask Size Medium   Control Settings   Set Rate 14 breat

## 2019-09-29 NOTE — PHYSICAL THERAPY NOTE
PT orders received, chart reviewed, and RN states that pt is not currently appropriate today as pt was on Bipap all night, and was getting an echo done at this time. Therefore, will attempt to see pt on 9/30/19.  CM

## 2019-09-29 NOTE — PLAN OF CARE
Received patient A/Ox2-3 with confusion and forgetfulness, Bipap, A Fib on tele at 0700  Switched to 4L oxygen via nasal cannula   Speech therapy was consulted and changed diet to cardiac diet with necator thick liquids  Patient on a fluid restriction, 150 RN  Outcome: Progressing  9/29/2019 1535 by David Corley RN  Outcome: Progressing

## 2019-09-30 ENCOUNTER — APPOINTMENT (OUTPATIENT)
Dept: GENERAL RADIOLOGY | Facility: HOSPITAL | Age: 84
DRG: 291 | End: 2019-09-30
Attending: INTERNAL MEDICINE
Payer: MEDICARE

## 2019-09-30 LAB
ATRIAL RATE: 153 BPM
BUN BLD-MCNC: 25 MG/DL (ref 7–18)
CALCIUM BLD-MCNC: 8.3 MG/DL (ref 8.5–10.1)
CHLORIDE SERPL-SCNC: 108 MMOL/L (ref 98–112)
CO2 SERPL-SCNC: 26 MMOL/L (ref 21–32)
CREAT BLD-MCNC: 1.11 MG/DL (ref 0.7–1.3)
DEPRECATED RDW RBC AUTO: 54.9 FL (ref 35.1–46.3)
ERYTHROCYTE [DISTWIDTH] IN BLOOD BY AUTOMATED COUNT: 15.1 % (ref 11–15)
GLUCOSE BLD-MCNC: 93 MG/DL (ref 70–99)
HAV IGM SER QL: 2 MG/DL (ref 1.6–2.6)
HCT VFR BLD AUTO: 30.8 % (ref 39–53)
HGB BLD-MCNC: 9.9 G/DL (ref 13–17.5)
MCH RBC QN AUTO: 31.9 PG (ref 26–34)
MCHC RBC AUTO-ENTMCNC: 32.1 G/DL (ref 31–37)
MCV RBC AUTO: 99.4 FL (ref 80–100)
PLATELET # BLD AUTO: 105 10(3)UL (ref 150–450)
POTASSIUM SERPL-SCNC: 3.9 MMOL/L (ref 3.5–5.1)
Q-T INTERVAL: 308 MS
QRS DURATION: 84 MS
QTC CALCULATION (BEZET): 401 MS
R AXIS: 48 DEGREES
RBC # BLD AUTO: 3.1 X10(6)UL (ref 3.8–5.8)
SODIUM SERPL-SCNC: 141 MMOL/L (ref 136–145)
T AXIS: 226 DEGREES
VENTRICULAR RATE: 102 BPM
WBC # BLD AUTO: 5.5 X10(3) UL (ref 4–11)

## 2019-09-30 PROCEDURE — 71045 X-RAY EXAM CHEST 1 VIEW: CPT | Performed by: INTERNAL MEDICINE

## 2019-09-30 PROCEDURE — 74230 X-RAY XM SWLNG FUNCJ C+: CPT | Performed by: INTERNAL MEDICINE

## 2019-09-30 PROCEDURE — 99232 SBSQ HOSP IP/OBS MODERATE 35: CPT | Performed by: HOSPITALIST

## 2019-09-30 NOTE — PROGRESS NOTES
[facility] Pulmonary/Critical Care Progress Note     SUBJECTIVE/Interval history: All events, procedures, notes reviewed. No acute events, he remains confused but denies complaints. No cough or dyspnea.  No f/c/s. +aspir 09/29/19  0551 09/30/19  0547   * 106* 93   BUN 24* 26* 25*   CREATSERUM 1.21 1.19 1.11   GFRAA 61 62 68   GFRNAA 53* 54* 59*   CA 8.8 8.4* 8.3*    143 141   K 4.7 4.3 3.9    111 108   CO2 23.0 25.0 26.0     Recent Labs   Lab 09/28/19  1 FLUORSCOPY TIME:  1 minute 7 seconds RADIATION DOSE (AIR KERMA PRODUCT):  25.3 uGy/m2   FINDINGS:  PHARYNGEAL PHASE:  Normal for age. ASPIRATION:  Positive. PENETRATION:  Positive. OTHER:  Negative.   PLEASE SEE SPEECH PATHOLOGIST REPORT FOR FULL ASSESSMENT Metoclopramide HCl    Assessment and Plan:     Picture consistent with congestive heart failure  Urinary retention likely representing prostatism/BPH  Coronary artery disease status post coronary artery bypass grafting  Chronic atrial fibrillation promptin

## 2019-09-30 NOTE — PROGRESS NOTES
Cardiology Progress Note     PRIMARY CARDIOLOGIST: [facility]      CONSULT FOR: DYSPNEA, COMPONENT OF DIASTOLIC CHF, ASPIRATION PNEUMONIA.    KNOWN CABG HX , PACER, CHRONIC AFIB NOT ON ANTICOAG.   ECHO EF AT 60%    ADVANCING DEMENTIA        SUBJECTIVE: SOB IS BETT click, rub or gallop. Abdomen:   Soft, non-tender. Bowel sounds normal. No masses,  No organomegaly. No abdominal bruits. Extremities: Extremities normal, atraumatic, no cyanosis or edema. Pulses: 2+ and symmetric all four extremities.    Skin: Skin c REC: CONT.  WITH CURRENT IV DIURETICS TIL OK TO HOME AND SWITCH BACK TO HOME MEDS        Anupama Inman MD  9/30/2019  9:13 AM

## 2019-09-30 NOTE — PROGRESS NOTES
[facility]  Progress Note     Juan C Chandler Patient Status:  Inpatient    1929 MRN [id number]   [facility] 7NE-A Attending [facility] Day [facility] 2 PCP Bruna Mccabe DO     Chief Complaint: SOB    S: Susy Garcia Creatinine Clearance: 40.7 mL/min (based on SCr of 1.11 mg/dL). Recent Labs   Lab 09/28/19  1704   PTP 14.5   INR 1.08       Recent Labs   Lab 09/28/19 1704   TROP <0.045            Imaging: Imaging data reviewed in Epic.     Medications:   • aspirin  8

## 2019-09-30 NOTE — PLAN OF CARE
PT A/ORIENTED TO 2-3, FORGETFUL, 96% ON 3L, BIPAP AT NIGHT, SOB AT TIMES, SCHEDULED NEBS, AFIB, NP LE EDEMA, DIMINISHED URINE OUTPUT, C/O OF MILD PAIN TO LT FOOT, GIVEN TYLENOL, NECTAR THICK LIQUIDS, SWALLOW EVAL LABS, CXR IN AM,   0430 - PT UNABLE TO VOID

## 2019-09-30 NOTE — PLAN OF CARE
Assumed care at 0700. Pt is alert. Oriented to person and place. Disoriented  to time and situation. Bipap at night. 98% on 2L O2 per NC. Repeat CXR completed. Walked in the halls with PT. PT recc Home w/HH PT.  Adequate amount of output throughout   the and precautions  - Recommend use of  RW for transfers and ambulation   Outcome: Progressing

## 2019-09-30 NOTE — VIDEO SWALLOW STUDY NOTE
ADULT VIDEOFLUOROSCOPIC SWALLOWING STUDY    Admission Date: 9/28/2019  Evaluation Date: 09/30/19  Radiologist: Matilde Craig    RECOMMENDATIONS   Diet Recommendations - Solids: Mechanical soft ground  Diet Recommendations - Liquid:  Thin  *Small sips ONLY    Fur difficulty; Difficulty breathing  Precautions: Aspiration    Reason for Referral: R/O aspiration(history of dysphagia)    Family/Patient Goals:   To eat and drink safely     ASSESSMENT   DYSPHAGIA ASSESSMENT  Test completed in conjunction with Radiologist. sinuses  Cleared/Reduced with: Secondary swallow  Laryngeal Penetration: None  Tracheal Aspiration: None  Strategy(ies) Implemented (Puree): Slow rate;Small bites and sips; Alternate consistencies;Effortful swallow;Multple swallows  Effectiveness: Yes     H present. Pt able to demonstrate safe swallow with small sips of thin liquids without penetration/aspiration. Recommend mechanical ground texture and thin liquids with utilization of compensatory strategies/swallow precautions.  If pt demonstrates any signs

## 2019-09-30 NOTE — PHYSICAL THERAPY NOTE
PHYSICAL THERAPY EVALUATION - INPATIENT     Room Number: 3422/5808-D  Evaluation Date: 9/30/2019  Type of Evaluation: Initial  Physician Order: PT Eval and Treat    Presenting Problem: Respiratory Distress  Reason for Therapy: Mobility Dysfunction and Theadora Bethany atherosclerosis    • Dementia ([facility]    • Depression    • Hearing impairment    • Heart attack ([facility] 1985   • High blood pressure    • High cholesterol    • History of stomach ulcers    • Peripheral vascular disease ([facility]    • Pneumonia due to organism    • -    ACTIVITY TOLERANCE  Heart Rate: at rest 103 bpm and with activity 107 bpm    O2 WALK  SPO2 on Room Air at Rest: 91  SPO2 Ambulation on Room Air: 83  SPO2 Ambulation on Oxygen: 93  Ambulation oxygen flow (liters per minute): 3      AM-PAC '6-Clicks' IN for respiratory distress secondary to fluid overload related acute on chronic diastolic heart failure. Pertinent comorbidities and personal factors impacting therapy include dementia, asthma, COPD, HTN, and PVD.   In this PT evaluation, the patient present

## 2019-09-30 NOTE — DIETARY NOTE
[facility]     Admitting diagnosis:  Acute pulmonary edema ([facility] [J81.0]  Hypoxia [R09.02]  Respiratory distress [R06.03]    Ht: 167.6 cm (5' 6\")  Wt: 71.7 kg (158 lb 1.6 oz).  This is 121 % of IBW  Body mass ind

## 2019-10-01 VITALS
TEMPERATURE: 99 F | WEIGHT: 149.5 LBS | HEIGHT: 66 IN | BODY MASS INDEX: 24.03 KG/M2 | OXYGEN SATURATION: 98 % | SYSTOLIC BLOOD PRESSURE: 106 MMHG | HEART RATE: 78 BPM | RESPIRATION RATE: 17 BRPM | DIASTOLIC BLOOD PRESSURE: 41 MMHG

## 2019-10-01 LAB
ALLENS TEST: POSITIVE
ARTERIAL BLD GAS O2 SATURATION: 95 % (ref 92–100)
ARTERIAL BLOOD GAS BASE EXCESS: 2.3 MMOL/L (ref ?–2)
ARTERIAL BLOOD GAS HCO3: 26 MEQ/L (ref 22–26)
ARTERIAL BLOOD GAS PCO2: 37 MM HG (ref 35–45)
ARTERIAL BLOOD GAS PH: 7.47 (ref 7.35–7.45)
ARTERIAL BLOOD GAS PO2: 75 MM HG (ref 80–105)
BUN BLD-MCNC: 20 MG/DL (ref 7–18)
CALCIUM BLD-MCNC: 8.3 MG/DL (ref 8.5–10.1)
CALCULATED O2 SATURATION: 96 % (ref 92–100)
CARBOXYHEMOGLOBIN: 1.7 % SAT (ref 0–3)
CHLORIDE SERPL-SCNC: 109 MMOL/L (ref 98–112)
CO2 SERPL-SCNC: 27 MMOL/L (ref 21–32)
CREAT BLD-MCNC: 0.96 MG/DL (ref 0.7–1.3)
GLUCOSE BLD-MCNC: 97 MG/DL (ref 70–99)
HAV IGM SER QL: 2 MG/DL (ref 1.6–2.6)
IONIZED CALCIUM: 1.2 MMOL/L (ref 1.12–1.32)
L/M: 2 L/MIN
LACTIC ACID ARTERIAL: <1.6 MMOL/L (ref 0.5–2)
METHEMOGLOBIN: 0.5 % SAT (ref 0.4–1.5)
PATIENT TEMPERATURE: 98.6 F
POTASSIUM BLOOD GAS: 3.9 MMOL/L (ref 3.6–5.1)
POTASSIUM SERPL-SCNC: 4 MMOL/L (ref 3.5–5.1)
SODIUM BLOOD GAS: 139 MMOL/L (ref 136–144)
SODIUM SERPL-SCNC: 141 MMOL/L (ref 136–145)
TOTAL HEMOGLOBIN: 10.8 G/DL (ref 12.6–17.4)

## 2019-10-01 PROCEDURE — 99239 HOSP IP/OBS DSCHRG MGMT >30: CPT | Performed by: HOSPITALIST

## 2019-10-01 RX ORDER — FUROSEMIDE 20 MG/1
20 TABLET ORAL DAILY
Qty: 7 TABLET | Refills: 0 | Status: SHIPPED | OUTPATIENT
Start: 2019-10-01 | End: 2019-10-08

## 2019-10-01 RX ORDER — ALFUZOSIN HYDROCHLORIDE 10 MG/1
10 TABLET, EXTENDED RELEASE ORAL
Qty: 30 TABLET | Refills: 0 | Status: SHIPPED | OUTPATIENT
Start: 2019-10-02 | End: 2019-11-14

## 2019-10-01 RX ORDER — IPRATROPIUM BROMIDE AND ALBUTEROL SULFATE 2.5; .5 MG/3ML; MG/3ML
3 SOLUTION RESPIRATORY (INHALATION)
Qty: 240 ML | Refills: 0 | Status: SHIPPED | OUTPATIENT
Start: 2019-10-01

## 2019-10-01 NOTE — PLAN OF CARE
Assumed care of pt 1930. Aox2-3. Forgetful. Reoriented as needed. 2L02, sat's 96%. Respirations easy and unlabored. Scheduled breathing treatments. Plan to check ABG in am. V-paced/a-fib per tele. IV diuretics as ordered. Aspiration precautions.  Fluid rest precautions  - Recommend use of  RW for transfers and ambulation   Outcome: Progressing

## 2019-10-01 NOTE — PROGRESS NOTES
[facility]  Cardiology Progress Note    Subjective:  No chest pain, mild SOB    Objective:  /60 (BP Location: Left arm)   Pulse 109   Temp 98.4 °F (36.9 °C) (Oral)   Resp 16   Ht 5' 6\" (1.676 m)   Wt 149 lb 8 oz (67.8 kg)   SpO2 96%   BMI 24.13 no     stenosis. No regurgitation. 4. Mitral valve: Mitral valve demonstrates mildly calcified leaflets.     Transvalvular velocity was within the normal range. There was no evidenceb     for stenosis. There was no regurgitation.   5. Left atrium: The left

## 2019-10-01 NOTE — PLAN OF CARE
NURSING DISCHARGE NOTE    Discharged Home via Wheelchair. Accompanied by Family member  Belongings Taken by patient/family. Assumed care @6722  VSS, A&Ox4, RA since 9am with saturation levels in the 90's.   A-fib, vpaced,  Denies Pain, Up with 1 ass Adequate for Discharge  10/1/2019 1259 by Brent Luke RN  Outcome: Progressing     Problem: Impaired Functional Mobility  Goal: Achieve highest/safest level of mobility/gait  Description  Interventions:  - Assess patient's functional ability and stabil

## 2019-10-01 NOTE — PHYSICAL THERAPY NOTE
Patient presented reclined in bedside chair; VSS and agreeable to participate. Transferred sit>stand w/supervision assist.  Ambulated 300' w/ CGA.   Upon completion, patient made comfortable reclined in bed side chair w/ call light in reach and chair alarm

## 2019-10-01 NOTE — PROGRESS NOTES
[facility]  Progress Note     Christa Bowens Patient Status:  Inpatient    1929 MRN [id number]   [facility]NE-A [facility] PCP Nato Grow, DO     Chief Complaint: SOB    S: Sandre Mech --   --        Estimated Creatinine Clearance: 47.1 mL/min (based on SCr of 0.96 mg/dL). Recent Labs   Lab 09/28/19  1704   PTP 14.5   INR 1.08       Recent Labs   Lab 09/28/19  1704   TROP <0.045            Imaging: Imaging data reviewed in [facility].     Cami Cormier DO

## 2019-10-01 NOTE — PROGRESS NOTES
[facility]  Progress Note    Heather Andrade Patient Status:  Inpatient    1929 MRN [id number]   [facility] 7NE-A Attending [facility] Day # 3 PCP Antonia Wiley DO     Subjective:  Heather Andrade is a(n) 8 26* 25* 20*   CREATSERUM 1.21 1.19 1.11 0.96     Recent Labs   Lab 09/28/19  1704   PTP 14.5   INR 1.08   PTT 28.5       Cultures: Cultures remain negative    Radiology:      Chest x-rays reviewed with stable bilateral lower lobe atelectasis versus infiltr

## 2019-10-01 NOTE — CM/SW NOTE
Pt is from [facility] and current with [facility] AUTHORITY. Pt will be d/c'd today back to [address] [place] [address] and continue with [facility] AUTHORITY. [facility] [phone] to notify them of pt's d/c today. Resume HH order sent via ecin.

## 2019-10-01 NOTE — SLP NOTE
SPEECH DAILY NOTE - INPATIENT    ASSESSMENT & PLAN   ASSESSMENT  Pt seen this AM for meal follow up and education session. Pt cooperative, pleasant, and alert. No family or caregivers present at bedside.  RN present at bedside reported good tolerance of die Meet Established Goals: 2    Session: 1/2    If you have any questions, please contact Kailyn Saleh M.S. [phone] [facility]  Pager [phone]

## 2019-10-01 NOTE — CARDIAC REHAB
HF  education attempted. Patient forgetful. No family present. Will revisit as time allows. Suzan Greene left for family.

## 2019-10-02 NOTE — DISCHARGE SUMMARY
[facility] [place] HOSPITALIST  DISCHARGE SUMMARY     Pratima Trnocoso Patient Status:  Inpatient    1929 MRN [id number]   [facility] 7NE-A Attending Jay Jay Briones MD   [facility] Day # 3 PCP Breann Gonsalez DO     Date of Admission: 2019  Date of recommendations (brief descriptions):  • NONE    Lab/Test results pending at Discharge:   · NONE    Consultants:  • Pulmonology-Dr. Simona Streeter  • Cardiology -Dr. Emerson Cohen    Discharge Medication List:     Discharge Medications      START taking these medications SINGULAIR      Take 1 tablet (10 mg total) by mouth nightly. Refills:  0     primidone 50 MG Tabs  Commonly known as: MYSOLINE      Take 50 mg by mouth 3 (three) times daily.    Refills:  0           Where to Get Your Medications      These medications w

## 2019-10-05 ENCOUNTER — HOSPITAL ENCOUNTER (OUTPATIENT)
Dept: LAB | Facility: HOSPITAL | Age: 84
Discharge: HOME | End: 2019-10-05
Attending: INTERNAL MEDICINE
Payer: MEDICARE

## 2019-10-05 DIAGNOSIS — J81.0 ACUTE PULMONARY EDEMA (HCC): ICD-10-CM

## 2019-10-05 DIAGNOSIS — I50.33 ACUTE ON CHRONIC HEART FAILURE WITH PRESERVED EJECTION FRACTION (HFPEF) (HCC): ICD-10-CM

## 2019-10-05 PROCEDURE — 80048 BASIC METABOLIC PNL TOTAL CA: CPT | Performed by: INTERNAL MEDICINE

## 2019-10-05 PROCEDURE — 36415 COLL VENOUS BLD VENIPUNCTURE: CPT | Performed by: INTERNAL MEDICINE

## 2019-10-12 ENCOUNTER — IMMUNIZATION (OUTPATIENT)
Dept: FAMILY MEDICINE CLINIC | Facility: CLINIC | Age: 84
End: 2019-10-12
Payer: MEDICARE

## 2019-10-12 DIAGNOSIS — Z23 NEED FOR VACCINATION: ICD-10-CM

## 2019-10-12 PROCEDURE — G0008 ADMIN INFLUENZA VIRUS VAC: HCPCS | Performed by: PHYSICIAN ASSISTANT

## 2019-10-12 PROCEDURE — 90662 IIV NO PRSV INCREASED AG IM: CPT | Performed by: PHYSICIAN ASSISTANT

## 2019-10-26 ENCOUNTER — HOSPITAL ENCOUNTER (INPATIENT)
Facility: HOSPITAL | Age: 84
LOS: 3 days | Discharge: HOME HEALTH | DRG: 291 | End: 2019-10-29
Attending: EMERGENCY MEDICINE | Admitting: HOSPITALIST
Payer: MEDICARE

## 2019-10-26 ENCOUNTER — APPOINTMENT (OUTPATIENT)
Dept: GENERAL RADIOLOGY | Facility: HOSPITAL | Age: 84
DRG: 291 | End: 2019-10-26
Attending: EMERGENCY MEDICINE
Payer: MEDICARE

## 2019-10-26 DIAGNOSIS — I10 BENIGN ESSENTIAL HTN: ICD-10-CM

## 2019-10-26 DIAGNOSIS — R07.9 ACUTE CHEST PAIN: Primary | ICD-10-CM

## 2019-10-26 DIAGNOSIS — I48.20 ATRIAL FIBRILLATION, CHRONIC (HCC): ICD-10-CM

## 2019-10-26 DIAGNOSIS — I50.33 ACUTE ON CHRONIC DIASTOLIC CHF (CONGESTIVE HEART FAILURE) (HCC): ICD-10-CM

## 2019-10-26 DIAGNOSIS — I21.4 NSTEMI (NON-ST ELEVATED MYOCARDIAL INFARCTION) (HCC): ICD-10-CM

## 2019-10-26 DIAGNOSIS — Z95.1 S/P CABG (CORONARY ARTERY BYPASS GRAFT): ICD-10-CM

## 2019-10-26 DIAGNOSIS — I25.5 ISCHEMIC CARDIOMYOPATHY: ICD-10-CM

## 2019-10-26 PROCEDURE — 71045 X-RAY EXAM CHEST 1 VIEW: CPT | Performed by: EMERGENCY MEDICINE

## 2019-10-26 PROCEDURE — 99223 1ST HOSP IP/OBS HIGH 75: CPT | Performed by: INTERNAL MEDICINE

## 2019-10-26 RX ORDER — ACETAMINOPHEN 650 MG/1
SUPPOSITORY RECTAL
Status: COMPLETED
Start: 2019-10-26 | End: 2019-10-26

## 2019-10-26 RX ORDER — FUROSEMIDE 10 MG/ML
40 INJECTION INTRAMUSCULAR; INTRAVENOUS ONCE
Status: COMPLETED | OUTPATIENT
Start: 2019-10-26 | End: 2019-10-26

## 2019-10-26 RX ORDER — FUROSEMIDE 10 MG/ML
40 INJECTION INTRAMUSCULAR; INTRAVENOUS DAILY
Status: DISCONTINUED | OUTPATIENT
Start: 2019-10-26 | End: 2019-10-28

## 2019-10-26 RX ORDER — ACETAMINOPHEN 650 MG/1
1300 SUPPOSITORY RECTAL ONCE
Status: DISCONTINUED | OUTPATIENT
Start: 2019-10-26 | End: 2019-10-26

## 2019-10-26 RX ORDER — ONDANSETRON 2 MG/ML
4 INJECTION INTRAMUSCULAR; INTRAVENOUS EVERY 4 HOURS PRN
Status: DISCONTINUED | OUTPATIENT
Start: 2019-10-26 | End: 2019-10-29

## 2019-10-26 RX ORDER — SPIRONOLACTONE 25 MG/1
25 TABLET ORAL ONCE
Status: COMPLETED | OUTPATIENT
Start: 2019-10-26 | End: 2019-10-26

## 2019-10-26 RX ORDER — ACETAMINOPHEN 650 MG/1
975 SUPPOSITORY RECTAL ONCE
Status: COMPLETED | OUTPATIENT
Start: 2019-10-26 | End: 2019-10-26

## 2019-10-26 RX ORDER — ENOXAPARIN SODIUM 100 MG/ML
40 INJECTION SUBCUTANEOUS DAILY
Status: DISCONTINUED | OUTPATIENT
Start: 2019-10-26 | End: 2019-10-29

## 2019-10-26 RX ORDER — DILTIAZEM HYDROCHLORIDE 5 MG/ML
10 INJECTION INTRAVENOUS EVERY 2 HOUR PRN
Status: DISCONTINUED | OUTPATIENT
Start: 2019-10-26 | End: 2019-10-29

## 2019-10-26 RX ORDER — IPRATROPIUM BROMIDE AND ALBUTEROL SULFATE 2.5; .5 MG/3ML; MG/3ML
3 SOLUTION RESPIRATORY (INHALATION) EVERY 6 HOURS PRN
Status: DISCONTINUED | OUTPATIENT
Start: 2019-10-26 | End: 2019-10-29

## 2019-10-26 RX ORDER — FLUTICASONE PROPIONATE AND SALMETEROL 250; 50 UG/1; UG/1
1 POWDER RESPIRATORY (INHALATION) EVERY 12 HOURS SCHEDULED
COMMUNITY

## 2019-10-26 NOTE — CONSULTS
[facility]    Report of Consultation    Natalie Thurston Patient Status:  Inpatient    1929 MRN [id number]   [facility] 2NE-A Att[facility] Day # 0 PCP Jennifer Nance DO     Date of Admission:  10 smoking history. He has never used smokeless tobacco. He reports current alcohol use of about 1.0 standard drinks of alcohol per week. He reports that he does not use drugs.     Allergies:    Coumadin [Warfarin]     BLEEDING    Comment:GI bleed  Ephedrine Value Date    INR 1.19 (H) 10/26/2019    INR 1.08 09/28/2019    INR 1.24 (H) 09/05/2019       Assessment/Plan:  Patient Active Problem List:     Pleural effusion     Late onset Alzheimer's disease without behavioral disturbance ([facility]     Coronary artery di

## 2019-10-26 NOTE — PLAN OF CARE
Assumed care of patient @ 0730, A/OX2, aware he is in hospital, hxt dementia, daughter at bedside, updated POA. Pt was admitted for fluid overload, afib w/RVR, currently controlled on tele.  Dr. Henna Lea saw pt in AM, started IV Lasix 40mg daily, metoprolol See additional Care Plan goals for specific interventions   Outcome: Progressing

## 2019-10-26 NOTE — H&P
[facility]  History and Physical     Laura Peon Patient Status:  Emergency    1929 MRN [id number]   Location [address] [facility] Attending Bernard Maloney MD   Hosp Day # 0 PCP Reinaldo Frazier, DO     Chief Complaint: Mirta Caraballo 1985   • High blood pressure    • High cholesterol    • History of stomach ulcers    • Peripheral vascular disease ([facility] [address] [facility]    • Pneumonia due to organism    • Problems with swallowing    • Shortness of breath    • Visual impairment         Past Surgical Hist Sodium 10 MG Oral Tab, Take 1 tablet (10 mg total) by mouth nightly., Disp: , Rfl: 0  primidone 50 MG Oral Tab, Take 50 mg by mouth 3 (three) times daily. , Disp: , Rfl:   Metoprolol Succinate ER 25 MG Oral Tablet 24 Hr, Take 12.5 mg by mouth daily.  HOLD is Imaging data reviewed in Epic. ASSESSMENT / PLAN:     1. Acute on chronic diastolic heart failure  1.  Appears to have come on suddenly and is likely due to recurrence of rapid rapid Afib which has spontaneously converted out of  2. 40 of IV lasix give

## 2019-10-26 NOTE — ED INITIAL ASSESSMENT (HPI)
Pt called ems for cp 5/10 and sob  Woke him up at [facility] living apt   Ems gave one nitro and four baby asa  CP resolved    Arrived wearing NRM

## 2019-10-26 NOTE — PROGRESS NOTES
Admitted this 81 y/o male , alert and oriented , breathing pattern easy and non labored. Pt. In mod. High back rest , denies any SOB at this time. Tele  Shows Afib on the monitor , HR 70's. Afebrile 97.9 .  Denies any pain or discomfort ; daughte

## 2019-10-26 NOTE — ED PROVIDER NOTES
Patient Seen in: [facility] Emergency Department      History   Patient presents with:  Fever (infectious)  Dyspnea ASHLEY SOB (respiratory)  Chest Pain Angina (cardiovascular)    Stated Complaint: pt at indep living  woke up with cp/sob   called ems REPLACEMENT Right 2015                    Social History    Tobacco Use      Smoking status: Former Smoker        Packs/day: 2.00        Years: 30.00        Pack years: 60        Types: Cigarettes      Smokeless tobacco: Never Used      Tobacco comment: qu noted.  Skin exam: Warm to touch. Good skin turgor. No lacerations, abrasions, lesions noted.     ED Course     Labs Reviewed   COMP METABOLIC PANEL (14) - Abnormal; Notable for the following components:       Result Value    Glucose 147 (*)     Calculate 144  Rhythm: Atrial Fibrillation  Reading: Atrial fibrillation with rapid ventricular response. Low voltage QRS segments. Nonspecific ST changes. No acute ST elevation. Anterior Q waves noted. Rate, axis, intervals are noted.   I agree with computer in 10/26/2019  3:40 AM                   Disposition and Plan     Clinical Impression:  Acute chest pain  (primary encounter diagnosis)  Atrial fibrillation, chronic  Acute on chronic diastolic CHF (congestive heart failure) (HCC)  Benign essential HTN  S/P C

## 2019-10-27 PROCEDURE — 99232 SBSQ HOSP IP/OBS MODERATE 35: CPT | Performed by: HOSPITALIST

## 2019-10-27 RX ORDER — AMIODARONE HYDROCHLORIDE 200 MG/1
200 TABLET ORAL 2 TIMES DAILY WITH MEALS
Status: DISCONTINUED | OUTPATIENT
Start: 2019-10-27 | End: 2019-10-29

## 2019-10-27 NOTE — PROGRESS NOTES
[facility]  Progress Note     Alpa Leger Patient Status:  Inpatient    1929 MRN [id number]   [facility] 2NE-A Attending [facility] PCP Nisha Wheeler DO     Chief Complaint: Shortness of micah Creatinine Clearance: 45.6 mL/min (based on SCr of 0.99 mg/dL). Recent Labs   Lab 10/26/19  0244   PTP 15.7*   INR 1.19*       Recent Labs   Lab 10/26/19  0244   TROP 0.297*            Imaging: Imaging data reviewed in Epic.     Medications:   • metoprol to span two midnight's based on the clinical documentation in H+P. Based on patients current state of illness, I anticipate that, after discharge, patient will require TBD.

## 2019-10-27 NOTE — PLAN OF CARE
Pt. Alert and o x 2-3 , on O2 at 2 li/min via nc , resp. pattern easy and non labored. Denies any pain or discomfort ,no SOB. Tele shows Afib on the monitor , HR at 70's. Cont. Monitor per tele/labs/v/s.  Fall/safety precautions instructed , placed specific interventions   Outcome: Progressing

## 2019-10-27 NOTE — PROGRESS NOTES
[facility] Cardiology Progress Note    Subjective:  Breathing is comfortable now. He is a bit overwhelmed with his declining memory.     Objective:  /46 (BP Location: Left arm)   Pulse 75   Temp 98 °F (36.7 °C) (Oral)   Resp 18   Ht 167.6 cm (5' 6\")   W - follow up CXR in am - may need pulmonary to be involved again pending course - they have followed him regularly on past admissions.

## 2019-10-27 NOTE — PLAN OF CARE
Assumed care of patient @ 0730, A/OX3, forgetful, hxt dementia. Pt admitted for SOB and afib, currently 2L O2, o2 sats WNL, comfortable. IV Lasix daily, incontinent, freq brief changing, HF protocol continued. Afib 60's on tele, sometimes V-paced.  Per card Goal  Description  Patient's Short Term Goal: \"breathe better\"    Interventions:   - IV Lasix daily  - O2 PRN  - ambulate, strict intake output, call light within reach, brief, urinal at bedside  - See additional Care Plan goals for specific intervention

## 2019-10-28 ENCOUNTER — APPOINTMENT (OUTPATIENT)
Dept: GENERAL RADIOLOGY | Facility: HOSPITAL | Age: 84
DRG: 291 | End: 2019-10-28
Attending: INTERNAL MEDICINE
Payer: MEDICARE

## 2019-10-28 PROCEDURE — 71045 X-RAY EXAM CHEST 1 VIEW: CPT | Performed by: INTERNAL MEDICINE

## 2019-10-28 PROCEDURE — 99232 SBSQ HOSP IP/OBS MODERATE 35: CPT | Performed by: HOSPITALIST

## 2019-10-28 RX ORDER — FUROSEMIDE 10 MG/ML
40 INJECTION INTRAMUSCULAR; INTRAVENOUS
Status: DISCONTINUED | OUTPATIENT
Start: 2019-10-28 | End: 2019-10-29

## 2019-10-28 RX ORDER — PRIMIDONE 50 MG/1
50 TABLET ORAL 3 TIMES DAILY
Status: DISCONTINUED | OUTPATIENT
Start: 2019-10-28 | End: 2019-10-29

## 2019-10-28 RX ORDER — ASPIRIN 81 MG/1
81 TABLET ORAL DAILY
Status: DISCONTINUED | OUTPATIENT
Start: 2019-10-28 | End: 2019-10-29

## 2019-10-28 RX ORDER — MONTELUKAST SODIUM 10 MG/1
10 TABLET ORAL NIGHTLY
Status: DISCONTINUED | OUTPATIENT
Start: 2019-10-28 | End: 2019-10-29

## 2019-10-28 RX ORDER — ESCITALOPRAM OXALATE 10 MG/1
10 TABLET ORAL DAILY
Status: DISCONTINUED | OUTPATIENT
Start: 2019-10-28 | End: 2019-10-29

## 2019-10-28 RX ORDER — MEMANTINE HYDROCHLORIDE 10 MG/1
10 TABLET ORAL 2 TIMES DAILY
Status: DISCONTINUED | OUTPATIENT
Start: 2019-10-28 | End: 2019-10-29

## 2019-10-28 NOTE — PROGRESS NOTES
[facility]  Progress Note     Navjot Ngo Patient Status:  Inpatient    1929 MRN [id number]   [facility] 2NE-A Attending Salima Stephens [facility] Day # 2 PCP Diana Anglin DO     Chief Complaint: shortness of micah of 0.99 mg/dL). Recent Labs   Lab 10/26/19  0244   PTP 15.7*   INR 1.19*       Recent Labs   Lab 10/26/19  0244   TROP 0.297*            Imaging: Imaging data reviewed in Epic.     Medications:   • aspirin  81 mg Oral Daily   • escitalopram  10 mg Oral D

## 2019-10-28 NOTE — CM/SW NOTE
Patient is from [facility] and admitted for chest pain. MSW will continue to follow for discharge planning needs. Await PT input.     Brooklynn Car LCSW

## 2019-10-28 NOTE — PROGRESS NOTES
[facility]  Cardiology Progress Note    Darren Bennett Patient Status:  Inpatient    1929 MRN [id number]   [facility] 2NE-A Attending [facility]helly [facility] Day # 2 PCP Melquiades Aleman DO     Subjective:  Sitting up in with meals   • enoxaparin  40 mg Subcutaneous Daily   • furosemide  40 mg Intravenous Daily         Assessment:  · Acute respiratory distress, improved.    · HFpEF, LVEF 60%, PAS 50 mm hg, improving with IV diuretics, weight down 6 lbs, -1.8L   · Atrial flu

## 2019-10-28 NOTE — PLAN OF CARE
Patient sitting up in chair, resting comfortably. Aox1- oriented to self, disoriented to time, place, and situation. Oxygenating 94% on room air. A-fib on tele. Vital signs stable. Denies pain or SOB at this time.  Lung sounds diminished bilaterally through output, call light within reach, brief, urinal at bedside  - See additional Care Plan goals for specific interventions   Outcome: Progressing     Problem: SAFETY ADULT - FALL  Goal: Free from fall injury  Description  INTERVENTIONS:  - Assess pt frequently

## 2019-10-29 VITALS
TEMPERATURE: 99 F | BODY MASS INDEX: 23.38 KG/M2 | WEIGHT: 145.5 LBS | OXYGEN SATURATION: 95 % | HEIGHT: 66 IN | SYSTOLIC BLOOD PRESSURE: 132 MMHG | HEART RATE: 67 BPM | RESPIRATION RATE: 18 BRPM | DIASTOLIC BLOOD PRESSURE: 70 MMHG

## 2019-10-29 PROCEDURE — 99239 HOSP IP/OBS DSCHRG MGMT >30: CPT | Performed by: HOSPITALIST

## 2019-10-29 RX ORDER — FUROSEMIDE 20 MG/1
20 TABLET ORAL DAILY
Status: ON HOLD | COMMUNITY
End: 2019-11-18

## 2019-10-29 RX ORDER — AMIODARONE HYDROCHLORIDE 200 MG/1
200 TABLET ORAL
Status: DISCONTINUED | OUTPATIENT
Start: 2019-10-30 | End: 2019-10-29

## 2019-10-29 RX ORDER — POTASSIUM CHLORIDE 20 MEQ/1
40 TABLET, EXTENDED RELEASE ORAL EVERY 4 HOURS
Status: DISCONTINUED | OUTPATIENT
Start: 2019-10-29 | End: 2019-10-29

## 2019-10-29 RX ORDER — AMIODARONE HYDROCHLORIDE 200 MG/1
200 TABLET ORAL DAILY
Qty: 30 TABLET | Refills: 1 | Status: SHIPPED | OUTPATIENT
Start: 2019-10-30

## 2019-10-29 NOTE — CM/SW NOTE
Case discussed in rounds. PT recommending an increase in services at home vs. Assisted Living. SW met with patient, he lives at [place] in an independent living apt. He likes it there but is feeling anxious about returning home alone.  Pt feels that his m

## 2019-10-29 NOTE — DIETARY NOTE
Clinical Nutrition    Received consult for heart failure diet education. Met with pt and he reports following low sodium, reviewed with pt.   Compliance expected    MARY JANE Nunez

## 2019-10-29 NOTE — PLAN OF CARE
Assumed care of patient at 2300. Patient a&ox2. RA. Afib/VPaced on tele. Denies any pain. Bed alarm on. Incontinent at times. Daily weight. Labs in am. Patient updated on poc, questions answered. Will continue to monitor.

## 2019-10-29 NOTE — DISCHARGE SUMMARY
[facility] PSYCHIATRIC [place] HOSPITALIST  DISCHARGE SUMMARY     Pratima Troncoso Patient Status:  Inpatient    1929 MRN [id number]   [place] 2NE-A Attending Jarrell Abdalla [facility] Day # 3 PCP Breann Gonsalez DO     Date of Admission: 10/26/2019 Significant improvement noted. Patient also found to be in rapid afib RVR upon admission but improved as fluid status improved. Patient lives independently, orders for PT/OT to evaluate patient, they are recommending assisted living upon discharge.      Lac Refills:  0     primidone 50 MG Tabs  Commonly known as: MYSOLINE      Take 50 mg by mouth 3 (three) times daily.    Refills:  0        ASK your doctor about these medications      Instructions Prescription details   lisinopril 2.5 MG Tabs      Take 1 tabl

## 2019-10-29 NOTE — OCCUPATIONAL THERAPY NOTE
OCCUPATIONAL THERAPY EVALUATION - INPATIENT     Room Number: 9701/7782-R  Evaluation Date: 10/29/2019  Type of Evaluation: Initial  Presenting Problem: Acute chest pain    Physician Order: IP Consult to Occupational Therapy  Reason for Therapy: ADL/IADL Dy BYPASS SURGERY      1990   • CABG  1982   • PACEMAKER/DEFIBRILLATOR  2010   • TOTAL HIP REPLACEMENT Right 2015       OCCUPATIONAL PROFILE    HOME SITUATION  Type of Home: Independent living facility  Home Layout: One level  Lives With: Alone    Toilet and TOLERANCE       O2 SATURATIONS       ACTIVITIES OF DAILY LIVING ASSESSMENT  AM-PAC ‘6-Clicks’ Inpatient Daily Activity Short Form  How much help from another person does the patient currently need…  -   Putting on and taking off regular lower body clothing addressed;SCDs in place    ASSESSMENT     Patient is a 80year old male admitted on 10/26/2019 for SOB. Complete medical history and occupational profile noted above. Functional outcome measures completed include AMPAC with score of 21.  In this OT evaluati with modified independent    Functional Transfer Goals  Patient will transfer from sit to stand:  with modified independent  Patient will transfer form stand to sit:  with modified independent  Patient will transfer to toilet:  with modified independent

## 2019-10-29 NOTE — PHYSICAL THERAPY NOTE
PHYSICAL THERAPY EVALUATION - INPATIENT     Room Number: 0232/0478-T  Evaluation Date: 10/29/2019  Type of Evaluation: Initial  Physician Order: PT Eval and Treat    Presenting Problem: chest pain; a-fib/a-flutter  Reason for Therapy: Mobility Dysfunct • Shortness of breath    • Visual impairment        Past Surgical History  Past Surgical History:   Procedure Laterality Date   • BYPASS SURGERY      1990   • CABG  1982   • PACEMAKER/DEFIBRILLATOR  2010   • TOTAL HIP REPLACEMENT Right 2015       HOME SI to stimuli and appears depressed  · Orientation Level:  oriented to place, oriented to situation, oriented to person and knows month, not year.   · Following Commands:  follows one step commands without difficulty  · Motor Planning: intact  · c/o being lost Assessment   Gait Assistance: Supervision  Distance (ft): 200  Assistive Device: Rolling walker  Pattern: R Foot flat;L Foot flat(dec in B step length; dec in nery)  Stoop/Curb Assistance: Not tested  Comment : na      Skilled Therapy Provided:     Bigg Tolliver case management)    Eval completed. ASSESSMENT     Patient is a 80year old male admitted on 10/26/2019 for presenting problems above. Pertinent comorbidities and personal factors impacting therapy include 9 items as documented above.       In this PT he is on the cusp of needing an increase in care, likely an assisted living scenario. To recap, these signs are:    Declining mental status over time. Fear, including fear of falling.   Distraction by fear and concerns of isolation of continuing cognitive

## 2019-10-29 NOTE — PROGRESS NOTES
[facility]  Progress Note     Larisa Mcguire Patient Status:  Inpatient    1929 MRN [id number]   [facility] 2NE-A Attending Kenny [facility] Day # 3 PCP Som Rosales DO     Chief Complaint: shortness of micah 41.1 mL/min (based on SCr of 1.1 mg/dL). Recent Labs   Lab 10/26/19  0244   PTP 15.7*   INR 1.19*       Recent Labs   Lab 10/26/19  0244   TROP 0.297*            Imaging: Imaging data reviewed in Epic.     Medications:   • aspirin  81 mg Oral Daily   • e

## 2019-10-29 NOTE — PROGRESS NOTES
[facility]  Cardiology Progress Note    Jevon Ernandez Patient Status:  Inpatient    1929 MRN [id number]   [facility] 2NE-A Attending Maur[id number]  [place] Day # 3 PCP Janet Zabala DO     Subjective:  No cardiac co escitalopram  10 mg Oral Daily   • Fluticasone Furoate-Vilanterol  1 puff Inhalation Daily   • Memantine HCl  10 mg Oral BID   • Montelukast Sodium  10 mg Oral Nightly   • primidone  50 mg Oral TID   • furosemide  40 mg Intravenous BID (Diuretic)   • metop

## 2019-10-29 NOTE — PLAN OF CARE
Received pt at 0730. Pt ash, forgetful. Stated he is very nervous about his mind forgetting all the time. He will be discharged with extra services per his daughters, for his memory at his home facility [facility]. Pt up to chair, has worked with PT/OT.  All

## 2019-10-29 NOTE — PLAN OF CARE
Received bedside report on this Pt., at [address]. Pt., was seen resting in bed at that time. When awake, Pt., is A&Ox1-2 is forgetful. Pt. Is in AFib on Tele monitor, sats greater than 92% on RA, denies any pain or distress.   POC discussed with Pt., and his q

## 2019-10-30 NOTE — CM/SW NOTE
10/30/19 1000   Discharge disposition   Expected discharge disposition Assisted [facility]   Name of Facillity/Home Care/Hospice [facility]   Additional Home Care/Hospice Provider   [facility] [address])   Discharge transportation Private car Subjective:      History was provided by the father, grandmother. Sharlene Ambriz is a 15 m.o. female who is brought in for this well child visit. Birth History    Birth     Length: 1' 10.05\" (0.56 m)     Weight: 8 lb 4.6 oz (3.76 kg)     HC 34 cm    Apgar     One: 8     Five: 9    Discharge Weight: 7 lb 9 oz (3.43 kg)    Delivery Method: Vaginal, Spontaneous    Gestation Age: 39 2/7 wks    Feeding: [address] Name: [facility]     Maternal labs negative, including HIV  B neg, s/p Rhogam  Passed hearing test b/l  Mother was incarcerated 2 months prior to delivery; prior use of marijuana and heroin (stopped as of Aug 2017). Neg UDS. Infant went home with father initially. There are no active problems to display for this patient. History reviewed. No pertinent past medical history. Immunization History   Administered Date(s) Administered    DTaP 2017    BIgS-Bjd-LLB 2018, 2018    Hep B Vaccine 2017, 2017    Hep B, Adol/Ped 2018    Hib 2017    Pneumococcal Conjugate (PCV-13) 2018, 2018    Pneumococcal Vaccine (Unspecified Type) 2017    Poliovirus vaccine 2017    Rotavirus Vaccine 2017    Rotavirus, Live, Monovalent Vaccine 2018, 2018     History of previous adverse reactions to immunizations:no    Current Issues:  Current concerns on the part of Vida's father and grandmother include occasional tugging on left ear. Is teething. No fever. Review of Nutrition:  Current nutrtion: appetite good, milk - whole, table foods and well balanced    Social Screening:  Current child-care arrangements: in home: primary caregiver: grandmother  Parental coping and self-care: Doing well; no concerns.   Secondhand smoke exposure?  no    Objective:     Visit Vitals  Pulse 131   Temp 97.1 °F (36.2 °C) (Axillary)   Resp 24   Ht 2' 7.3\" (0.795 m)   Wt 24 lb 2.4 oz (11 kg)   HC 45.1 cm   SpO2 99%   BMI 17.33 kg/m²     Wt Readings from Last 3 Encounters:   11/01/18 24 lb 2.4 oz (11 kg) (94 %, Z= 1.58)*   07/27/18 21 lb 2.4 oz (9.594 kg) (89 %, Z= 1.25)*   04/26/18 18 lb 1.8 oz (8.215 kg) (84 %, Z= 0.98)*     * Growth percentiles are based on WHO (Girls, 0-2 years) data. Ht Readings from Last 3 Encounters:   11/01/18 2' 7.3\" (0.795 m) (98 %, Z= 2.03)*   07/27/18 (!) 2' 6\" (0.762 m) (>99 %, Z= 2.51)*   04/26/18 (!) 2' 3.56\" (0.7 m) (97 %, Z= 1.90)*     * Growth percentiles are based on WHO (Girls, 0-2 years) data. Body mass index is 17.33 kg/m². 75 %ile (Z= 0.67) based on WHO (Girls, 0-2 years) BMI-for-age based on BMI available as of 11/1/2018.  94 %ile (Z= 1.58) based on WHO (Girls, 0-2 years) weight-for-age data using vitals from 11/1/2018.  98 %ile (Z= 2.03) based on WHO (Girls, 0-2 years) Length-for-age data based on Length recorded on 11/1/2018. HC Readings from Last 3 Encounters:   11/01/18 45.1 cm (54 %, Z= 0.10)*   07/27/18 43.2 cm (31 %, Z= -0.49)*   04/26/18 42.5 cm (61 %, Z= 0.28)*     * Growth percentiles are based on WHO (Girls, 0-2 years) data. Growth parameters are noted and are appropriate for age. General:  alert, cooperative, no distress, appears stated age   Skin:  normal   Head:  normal fontanelles, nl appearance, nl palate   Eyes:  sclerae white, pupils equal and reactive   Ears:  normal bilateral   Mouth:  No perioral or gingival cyanosis or lesions. Tongue is normal in appearance. Lungs:  clear to auscultation bilaterally   Heart:  regular rate and rhythm, S1, S2 normal, no murmur, click, rub or gallop   Abdomen:  soft, non-tender.  Bowel sounds normal. No masses,  no organomegaly   Extremities:  extremities normal, atraumatic, no cyanosis or edema   Neuro:  alert, moves all extremities spontaneously, gait normal, no head lag       Developmental 12 Months Appropriate    Will play [email or web address] (wait for parent to re-appear) Yes Yes on 11/1/2018 (Age - 12mo)    Will hold on to objects hard enough that it takes effort to get them back Yes Yes on 11/1/2018 (Age - 12mo)    Can stand holding on to furniture for 30 seconds or more Yes Yes on 11/1/2018 (Age - 17mo)    Makes 'mama' or 'domitila' sounds Yes Yes on 11/1/2018 (Age - 12mo)    Can go from sitting to standing without help Yes Yes on 11/1/2018 (Age - 12mo)    Uses 'pincer grasp' between thumb and fingers to  small objects Yes Yes on 11/1/2018 (Age - 12mo)    Can tell parent from strangers Yes Yes on 11/1/2018 (Age - 12mo)    Can go from supine to sitting without help Yes Yes on 11/1/2018 (Age - 12mo)    Tries to imitate spoken sounds (not necessarily complete words) Yes Yes on 11/1/2018 (Age - 12mo)    Can bang 2 small objects together to make sounds Yes Yes on 11/1/2018 (Age - 12mo)       Assessment:     Healthy 15 m.o. old exam.    Plan:     1. Anticipatory guidance: Gave CRS handout on well-child issues at this age, avoiding potential choking hazards (large, spherical, or coin shaped foods) unit, observing while eating; considering CPR classes, whole milk till 3yo then taper to lowfat or skim, weaning to cup at 9-12mos of ago, importance of varied diet     2. Laboratory screening  a. Hb or HCT (CDC recc's for children at risk between 9-12mos then again 6mos later; AAP recommends once age 5-12mos): due  b. PPD: not applicable (Recc'd annually if at risk: immunosuppression, clinical suspicion, poor/overcrowded living conditions; recent immigrant from TB-prevalent regions; contact with adults who are HIV+, homeless, IVDU,  NH residents, farm workers, or with active TB)    3. AP pelvis x-ray to screen for developmental dysplasia of the hip :no    4. Orders placed during this Well Child Exam:  Orders Placed This Encounter    Varicella virus vaccine, live, SC     Order Specific Question:   Was provider counseling for all components provided during this visit? Answer:    Yes    Pneumococcal conjugate (PCV13) (Prevnar 13) vaccine, IM (ages 7 weeks through 5 yr)     Order Specific Question:   Was provider counseling for all components provided during this visit? Answer: Yes    Hemophilus influenza B vaccine (HIB) PRP - T Conjugate, (4 Dose Schedule), IM     Order Specific Question:   Was provider counseling for all components provided during this visit? Answer: Yes    HGB AND HCT    LEAD, PEDIATRIC     Order Specific Question:   Patient Race? Answer:        Follow-up Disposition:  Return in about 3 months (around 2/1/2019) for 1 month RN visit for MMR and Hep A; 3 months for [facility]. Nakia Kaur M.D.

## 2019-11-09 ENCOUNTER — OFFICE VISIT (OUTPATIENT)
Dept: FAMILY MEDICINE CLINIC | Facility: CLINIC | Age: 84
End: 2019-11-09
Payer: MEDICARE

## 2019-11-09 DIAGNOSIS — Z23 NEED FOR VACCINATION: Primary | ICD-10-CM

## 2019-11-09 PROCEDURE — 90732 PPSV23 VACC 2 YRS+ SUBQ/IM: CPT | Performed by: PHYSICIAN ASSISTANT

## 2019-11-09 PROCEDURE — G0009 ADMIN PNEUMOCOCCAL VACCINE: HCPCS | Performed by: PHYSICIAN ASSISTANT

## 2019-11-09 NOTE — PROGRESS NOTES
Patient presents with daughter/POA for pneumonia vaccination , pneumovax 23. Told to get pneumonia shot during last hospitalization. Feeling ok today without recent fever. No problems with immunizations.   Vaccine questionnaire completed and benign and s

## 2019-11-14 ENCOUNTER — APPOINTMENT (OUTPATIENT)
Dept: GENERAL RADIOLOGY | Facility: HOSPITAL | Age: 84
DRG: 292 | End: 2019-11-14
Attending: EMERGENCY MEDICINE
Payer: MEDICARE

## 2019-11-14 ENCOUNTER — HOSPITAL ENCOUNTER (INPATIENT)
Facility: HOSPITAL | Age: 84
LOS: 3 days | Discharge: HOME HEALTH | DRG: 292 | End: 2019-11-18
Attending: EMERGENCY MEDICINE | Admitting: HOSPITALIST
Payer: MEDICARE

## 2019-11-14 DIAGNOSIS — I50.9 ACUTE ON CHRONIC CONGESTIVE HEART FAILURE, UNSPECIFIED HEART FAILURE TYPE (HCC): Primary | ICD-10-CM

## 2019-11-14 DIAGNOSIS — J81.0 ACUTE PULMONARY EDEMA (HCC): ICD-10-CM

## 2019-11-14 PROCEDURE — 99223 1ST HOSP IP/OBS HIGH 75: CPT | Performed by: HOSPITALIST

## 2019-11-14 PROCEDURE — 71045 X-RAY EXAM CHEST 1 VIEW: CPT | Performed by: EMERGENCY MEDICINE

## 2019-11-14 RX ORDER — FUROSEMIDE 10 MG/ML
40 INJECTION INTRAMUSCULAR; INTRAVENOUS ONCE
Status: COMPLETED | OUTPATIENT
Start: 2019-11-14 | End: 2019-11-14

## 2019-11-14 RX ORDER — TAMSULOSIN HYDROCHLORIDE 0.4 MG/1
0.4 CAPSULE ORAL DAILY
COMMUNITY

## 2019-11-14 RX ORDER — BUMETANIDE 0.25 MG/ML
1 INJECTION, SOLUTION INTRAMUSCULAR; INTRAVENOUS EVERY 12 HOURS
Status: DISCONTINUED | OUTPATIENT
Start: 2019-11-14 | End: 2019-11-15

## 2019-11-15 ENCOUNTER — APPOINTMENT (OUTPATIENT)
Dept: CV DIAGNOSTICS | Facility: HOSPITAL | Age: 84
DRG: 292 | End: 2019-11-15
Attending: INTERNAL MEDICINE
Payer: MEDICARE

## 2019-11-15 PROBLEM — J81.0 ACUTE PULMONARY EDEMA (HCC): Status: ACTIVE | Noted: 2019-11-15

## 2019-11-15 PROBLEM — I50.9 ACUTE ON CHRONIC CONGESTIVE HEART FAILURE, UNSPECIFIED HEART FAILURE TYPE (HCC): Status: ACTIVE | Noted: 2019-11-15

## 2019-11-15 PROCEDURE — 93306 TTE W/DOPPLER COMPLETE: CPT | Performed by: INTERNAL MEDICINE

## 2019-11-15 PROCEDURE — 99232 SBSQ HOSP IP/OBS MODERATE 35: CPT | Performed by: HOSPITALIST

## 2019-11-15 RX ORDER — FUROSEMIDE 10 MG/ML
40 INJECTION INTRAMUSCULAR; INTRAVENOUS
Status: DISCONTINUED | OUTPATIENT
Start: 2019-11-17 | End: 2019-11-15

## 2019-11-15 RX ORDER — AMIODARONE HYDROCHLORIDE 200 MG/1
200 TABLET ORAL DAILY
Status: DISCONTINUED | OUTPATIENT
Start: 2019-11-15 | End: 2019-11-18

## 2019-11-15 RX ORDER — IPRATROPIUM BROMIDE AND ALBUTEROL SULFATE 2.5; .5 MG/3ML; MG/3ML
3 SOLUTION RESPIRATORY (INHALATION) EVERY 6 HOURS PRN
Status: DISCONTINUED | OUTPATIENT
Start: 2019-11-15 | End: 2019-11-18

## 2019-11-15 RX ORDER — ALFUZOSIN HYDROCHLORIDE 10 MG/1
10 TABLET, EXTENDED RELEASE ORAL NIGHTLY
Status: DISCONTINUED | OUTPATIENT
Start: 2019-11-15 | End: 2019-11-18

## 2019-11-15 RX ORDER — ASPIRIN 81 MG/1
81 TABLET, CHEWABLE ORAL DAILY
Status: DISCONTINUED | OUTPATIENT
Start: 2019-11-15 | End: 2019-11-18

## 2019-11-15 RX ORDER — ESCITALOPRAM OXALATE 10 MG/1
10 TABLET ORAL DAILY
Status: DISCONTINUED | OUTPATIENT
Start: 2019-11-15 | End: 2019-11-18

## 2019-11-15 RX ORDER — CETIRIZINE HYDROCHLORIDE 10 MG/1
10 TABLET ORAL DAILY
Status: DISCONTINUED | OUTPATIENT
Start: 2019-11-15 | End: 2019-11-18

## 2019-11-15 RX ORDER — HEPARIN SODIUM 5000 [USP'U]/ML
5000 INJECTION, SOLUTION INTRAVENOUS; SUBCUTANEOUS EVERY 8 HOURS SCHEDULED
Status: DISCONTINUED | OUTPATIENT
Start: 2019-11-15 | End: 2019-11-18

## 2019-11-15 RX ORDER — ACETAMINOPHEN 325 MG/1
650 TABLET ORAL EVERY 6 HOURS PRN
Status: DISCONTINUED | OUTPATIENT
Start: 2019-11-15 | End: 2019-11-18

## 2019-11-15 RX ORDER — MONTELUKAST SODIUM 10 MG/1
10 TABLET ORAL NIGHTLY
Status: DISCONTINUED | OUTPATIENT
Start: 2019-11-15 | End: 2019-11-18

## 2019-11-15 RX ORDER — METOCLOPRAMIDE HYDROCHLORIDE 5 MG/ML
5 INJECTION INTRAMUSCULAR; INTRAVENOUS EVERY 8 HOURS PRN
Status: DISCONTINUED | OUTPATIENT
Start: 2019-11-15 | End: 2019-11-18

## 2019-11-15 RX ORDER — BUMETANIDE 0.25 MG/ML
1 INJECTION, SOLUTION INTRAMUSCULAR; INTRAVENOUS EVERY 12 HOURS
Status: DISCONTINUED | OUTPATIENT
Start: 2019-11-15 | End: 2019-11-17

## 2019-11-15 RX ORDER — PRIMIDONE 50 MG/1
50 TABLET ORAL 3 TIMES DAILY
Status: DISCONTINUED | OUTPATIENT
Start: 2019-11-15 | End: 2019-11-18

## 2019-11-15 RX ORDER — MEMANTINE HYDROCHLORIDE 10 MG/1
10 TABLET ORAL 2 TIMES DAILY
Status: DISCONTINUED | OUTPATIENT
Start: 2019-11-15 | End: 2019-11-18

## 2019-11-15 RX ORDER — ONDANSETRON 2 MG/ML
4 INJECTION INTRAMUSCULAR; INTRAVENOUS EVERY 6 HOURS PRN
Status: DISCONTINUED | OUTPATIENT
Start: 2019-11-15 | End: 2019-11-18

## 2019-11-15 RX ORDER — POTASSIUM CHLORIDE 20 MEQ/1
40 TABLET, EXTENDED RELEASE ORAL EVERY 4 HOURS
Status: COMPLETED | OUTPATIENT
Start: 2019-11-15 | End: 2019-11-15

## 2019-11-15 NOTE — PLAN OF CARE
Pt alert able to make needs known. Denies  any pain   Family at bedside. Cont on IV diuretics. Echo done this Am results pending    Plan of care discussed with pt and family , verbalized understanding.   Call light within reach, will cont to [facility]

## 2019-11-15 NOTE — PROGRESS NOTES
Pharmacy Note: Renal dose adjustment for Metoclopramide (Reglan)  Emily Smith has been prescribed Metoclopramide (Reglan) 10 mg every 8 hours as needed. Estimated Creatinine Clearance: 32.5 mL/min (A) (based on SCr of 1.34 mg/dL (H)).     His calculat

## 2019-11-15 NOTE — ED INITIAL ASSESSMENT (HPI)
Sent in by Cardiology with daughter for 15 pound weight gain since end of October. Increase in lasix to 40mg twice daily Mon- Tues and Wed with no improvement. Denies chest pain but does report. Exertional shortness of breath noted. No fever or cough.  Pt d

## 2019-11-15 NOTE — PROGRESS NOTES
· [facility]      Subjective:  Dyspnea improved from yesterday     Objective:  132/59  Afebrile  AFib v pace  I/O  Incomplete  BUN/cr 21/1.11  Hgb 10.9  plt 189    Neuro: awake/alert  HEENT: mild JVD  Cardiac: S1 S2 regular  Lungs: decreased B

## 2019-11-15 NOTE — PLAN OF CARE
PATIENT ADMITTED WITH SOB , CX-RAY SHOWS MILD CHF EXCEREBRATION , IV LASIX 40 M G RECEIVED FROM ER , CARDIOLOGY SEEMED, BUMEX 1GM  Q12RS  AND ECHO IN AM ORDERED , PLAN OF CARE DISCUSSED WITH DAUGHTER AND PATIENT   , PRESSURE ULCER  STAGE ONE NOTICED ON BOT

## 2019-11-15 NOTE — ED PROVIDER NOTES
Patient Seen in: [facility] Emergency Department      History   Patient presents with:  Dyspnea ASHLEY SOB (respiratory)    Stated Complaint: CHF Exac    HPI    51-year-old male with history of CHF, COPD, coronary disease, pacemaker/defibrillator, pres frequency: Never    Drug use: Never             Review of Systems    Positive for stated complaint: CHF Exac  Other systems are as noted in HPI. Constitutional and vital signs reviewed. All other systems reviewed and negative except as noted above. ACTIVATED - Normal   CBC WITH DIFFERENTIAL WITH PLATELET    Narrative: The following orders were created for panel order CBC WITH DIFFERENTIAL WITH PLATELET.   Procedure                               Abnormality         Status                     ------

## 2019-11-15 NOTE — CONSULTS
Cardiology Consult       NAME: Roanne Councilman - ROOM: B2/B2 - MRN: [id number] - Age: 80year old - :  1929    Date of Admission: 2019  7:33 PM  Admission Diagnosis: No admission diagnoses are documented for this encounter.     Chief Complaint: (200 mg total) by mouth daily. metoprolol Tartrate 25 MG Oral Tab, Take 1 tablet (25 mg total) by mouth 2x Daily(Beta Blocker). furosemide 20 MG Oral Tab, Take 20 mg by mouth daily.   fluticasone-salmeterol 250-50 MCG/DOSE Inhalation Aerosol Powder, Evyat

## 2019-11-15 NOTE — PROGRESS NOTES
[facility]  Progress Note     Clay De Oliveira Patient Status:  Inpatient    1929 MRN [id number]   [facility] 2NE-A Attending Candido Trejo MD   Hosp Day # 0 PCP Yoshi Gerard, DO     Chief Complaint:  Wt gain, SOB   S:  L Amiodarone  6. SSS sp PPM   1. Aspirin  2. Metoprolol  7. COPD  1. Breo  2. Singulair  3. Nebs PRN  8. Depression  1. Celexa  9. Dementia   1. Namenda   10. BPH  1.  Alfuzosin changed to Flomax because of choking on Alfuzosin (pill size)  PLAN  Echo results

## 2019-11-15 NOTE — CONSULTS
[facility]    PATIENT'S NAME: INES MCDOWELL   ATTENDING PHYSICIAN: Deepika Razo D.O.   CONSULTING PHYSICIAN: Antonio Fountain M.D.    PATIENT ACCOUNT#:   [de-identified]    LOCATION:  [address] 1  MEDICAL RECORD #:   [id number]       DATE OF BIRTH:  1 pressure 120/64, pulse 64, respirations 18, afebrile. HEENT:  Poor dentition. Anicteric, normocephalic. NECK:  Mildly elevated JVP. Supple. LUNGS:  Decreased breath sounds at bases with rales above. HEART:  Systolic murmur is noted.   ABDOMEN:  Sof

## 2019-11-15 NOTE — CM/SW NOTE
SW received hospice order, need to meet with pt's daughters that are considering. Received call from Pt's daughter/POA Rama López cell#[phone].  She spoke with her sister Kevin Nieto (who was here this morning) and understands there has been some discussion abo

## 2019-11-15 NOTE — H&P
[facility]  History and Physical     Bg Arroyo Patient Status:  Emergency    1929 MRN [id number]   Location [facility] [facility] Attending Shalonda Jesus, [facility] Day # 0 PCP Franco Kenny DO     Chief Complaint:  Sh drugs.     Family History:   Family History   Problem Relation Age of Onset   • Cancer Neg    • Heart Disorder Neg         Allergies:   Coumadin [Warfarin]     BLEEDING    Comment:GI bleed  Ephedrine               OTHER (SEE COMMENTS)    Comment:hyperactive 5' 5\" (1.651 m)   Wt 162 lb (73.5 kg)   SpO2 96%   BMI 26.96 kg/m²   General: No acute distress. HEENT: Normocephalic atraumatic.  Moist mucous membranes  Respiratory: Bibasilar crackles  Cardiovascular: S1, S2. Regular (+) murmur  Abdomen: Soft, nontend

## 2019-11-16 PROCEDURE — 99232 SBSQ HOSP IP/OBS MODERATE 35: CPT | Performed by: HOSPITALIST

## 2019-11-16 RX ORDER — POTASSIUM CHLORIDE 20 MEQ/1
40 TABLET, EXTENDED RELEASE ORAL ONCE
Status: COMPLETED | OUTPATIENT
Start: 2019-11-16 | End: 2019-11-16

## 2019-11-16 NOTE — PLAN OF CARE
Patient has adequate urine output  able to do accurate urine output w/ external catheter  Patient deny dyspnea at rest  cc yousif is less  room air 02 sat  92%   w/c patient removes  Alert, forgetful follows commands  retention is  poor comprehension is fa

## 2019-11-16 NOTE — PROGRESS NOTES
· [facility]      Subjective:  Dyspnea improved, weak    Objective:  112/52  Afebrile  AFib v pace  I/O  Incomplete weight unchanged  BUN/cr 21/1.13     Neuro: awake/alert  HEENT: mild JVD  Cardiac: S1 S2 regular  Lungs: decreased BS bases alexander

## 2019-11-16 NOTE — PLAN OF CARE
Alert. Oriented x2-3. Forgetful. Reoriented. V.paced per tele. Denies pain, sob. Ble w/ 1+ pitting edema. Scds in place. Condom cath in place. On iv bumex bid. Cont. to monitor pt.

## 2019-11-16 NOTE — PROGRESS NOTES
[facility]  Progress Note     Leyla Petersen Patient Status:  Inpatient    1929 MRN [id number]   [facility] 2NE-A Attending Gail Vila MD   Hosp Day # 1 PCP Carol Mei DO     Chief Complaint:  Wt gain, SOB   S:  P fibrillation > NSR on Amiodarone  6. SSS sp PPM   1. Aspirin  2. Metoprolol  7. COPD  1. Breo  2. Singulair  3. Nebs PRN  8. Depression  1. Celexa  9. Dementia   1. Namenda   10. BPH  1.  Alfuzosin changed to Flomax because of choking on Alfuzosin (pill siz

## 2019-11-16 NOTE — PLAN OF CARE
Assumed care of pt @ 1500. AOx2, knows his name and that he is in the hospital. Georges Setting. Bed and chair alarm for safety. On RA. Bumex IV BID. Vpaced on tele. K replaced per protocol. Plan for labs in am, palliative consult.  Pt updated on plan of care, wi

## 2019-11-17 PROCEDURE — 99231 SBSQ HOSP IP/OBS SF/LOW 25: CPT | Performed by: HOSPITALIST

## 2019-11-17 RX ORDER — TORSEMIDE 20 MG/1
20 TABLET ORAL DAILY
Status: DISCONTINUED | OUTPATIENT
Start: 2019-11-18 | End: 2019-11-18

## 2019-11-17 NOTE — PROGRESS NOTES
· [facility]      Subjective:  No new issues overnight, dyspnea improved    Objective:  105/48  Afebrile  AFib v pace  I/O -1286  Weight down 2#  Labs pending     Neuro: awake/alert  HEENT: no JVD moist mucosa  Cardiac: S1 S2 regular  Lungs: d

## 2019-11-17 NOTE — PLAN OF CARE
V pacing had generalized aches this morning relief w/ tylenol  Patient walk aroun hallways  tolerated activity fairly well   Sinus rhythm  per patient no dyspnea this afternoon  Problem: CARDIOVASCULAR - ADULT  Goal: Absence of cardiac arrhythmias or at ba

## 2019-11-17 NOTE — PHYSICAL THERAPY NOTE
PHYSICAL THERAPY QUICK EVALUATION - INPATIENT    Room Number: 2609/2609-A  Evaluation Date: 11/17/2019  Presenting Problem: (acute on chronic CHF)  Physician Order: PT Eval and Treat    Problem List  Principal Problem:    Acute on chronic congestive hear BASIC MOBILITY  How much difficulty does the patient currently have. ..  -   Turning over in bed (including adjusting bedclothes, sheets and blankets)?: None   -   Sitting down on and standing up from a chair with arms (e.g., wheelchair, bedside commode, et discharged from Physical Therapy services. Please re-order if a new functional limitation presents during this admission. GOALS  Patient was able to achieve the following goals . ..     Patient was able to transfer Safely and independently   Patient able

## 2019-11-17 NOTE — PROGRESS NOTES
[facility]  Progress Note     Juan C Ramesh Patient Status:  Inpatient    1929 MRN [id number]   [facility] 2NE-A Attending Mich Sarabia MD   Hosp Day # 2 PCP Bruna Mccabe DO     Chief Complaint:  Wt gain, SOB   S:  P cards; to oral today per cards  2. CAD sp CABG  3. Essential hypertension  4. Dyslipidemia   5. Atrial fibrillation > NSR on Amiodarone  6. SSS sp PPM   1. Aspirin  2. Metoprolol  7. COPD  1. Breo  2. Singulair  3. Nebs PRN  8. Depression  1. Celexa  9.  Deanna Halon

## 2019-11-17 NOTE — PLAN OF CARE
Assumed care for pt at 19:30 on 11/16    A&O to self and place.    Denies pain  VSS on RA, spO2 95%  V paced on tele  Lungs clear  Voiding in condom catheter to standard drainage bag  Briefed   Mepilex on sacrum  Heparin for VTE prophylaxis  1 assist with w appropriate  - Consider OT/PT consult to assist with strengthening/mobility  - Encourage toileting schedule  Outcome: Progressing

## 2019-11-17 NOTE — ICD/PM
Remote Cardiac Device Interrogation        Device :SeamBLiSS    Device type: dual lead pacemaker    Battery longevity: ~ 4.5 year    Current rhythm: AFib v pacing    Lead impedance, sensing, auto thresholds within normal range.     AFib nearly >

## 2019-11-18 VITALS
WEIGHT: 155 LBS | HEIGHT: 65 IN | RESPIRATION RATE: 16 BRPM | DIASTOLIC BLOOD PRESSURE: 43 MMHG | BODY MASS INDEX: 25.83 KG/M2 | OXYGEN SATURATION: 98 % | HEART RATE: 61 BPM | TEMPERATURE: 98 F | SYSTOLIC BLOOD PRESSURE: 107 MMHG

## 2019-11-18 PROCEDURE — 99238 HOSP IP/OBS DSCHRG MGMT 30/<: CPT | Performed by: HOSPITALIST

## 2019-11-18 RX ORDER — TORSEMIDE 20 MG/1
20 TABLET ORAL DAILY
Qty: 30 TABLET | Refills: 3 | Status: SHIPPED | OUTPATIENT
Start: 2019-11-19

## 2019-11-18 NOTE — PLAN OF CARE
Pt denies c/o pain. A/Ox2. Re-oriented to hospital room. Forgetful. Follows commands. Bilateral breath sounds diminished with major exertion on deep breaths. Room air. V-paced with underlying afib.  Pedal pulses +1 with pitting edema +1 on right and left

## 2019-11-18 NOTE — PROGRESS NOTES
[facility]  Progress Note     Nelly [place] Patient Status:  Inpatient    1929 MRN [id number]   [facility] 2NE-A Attending Daquan Frazier MD   Hosp Day # 3 PCP Reuben Mcrae DO     Chief Complaint:  Wt gain, SOB   S:  P Clearance: 41.1 mL/min (based on SCr of 1.06 mg/dL). Recent Labs   Lab 11/14/19 1958   PTP 15.5*   INR 1.17*     Recent Labs   Lab 11/14/19 1958   TROP <0.045        Imaging: Imaging data reviewed in Epic. ASSESSMENT / PLAN:   1.  Acute on chronic heart

## 2019-11-18 NOTE — CM/SW NOTE
Per unit RN, pt will likely d/c today. EHSAN left a message for Pt's dtr/POA, Juvencio Maldonado, will follow. Juvencio Maldonado called back.  EHSAN confirmed d/c plans back to [place] today, Juvencio Maldonado will provide transport and plans to be here around 4pm. She is in agreement f

## 2019-11-18 NOTE — PLAN OF CARE
Assumed patient care this morning. Alert, awake. Denies pain. Up in chair. Ambulating with assist. Episode of coughing after breakfast this morning. Evaluated by speech therapy and cleared. Medically cleared for d/c back to [place] today.  Daughter will

## 2019-11-18 NOTE — DIETARY NOTE
[facility]    NUTRITION INITIAL ASSESSMENT    Pt does not meet malnutrition criteria.     NUTRITION DIAGNOSIS/PROBLEM:     Increased nutrient needs (protein) related to physiologic causes increasing nutrient needs (aging) as evidenced by age greater th PRESCRIPTION:  Calories: 4359-1252 calories/day (25-30 calories per kg)  Protein: 88-98 grams protein/day (1.25-1.4 grams protein per kg)  Fluid: ~1 ml/kcal or per MD discretion    MONITOR AND EVALUATE/NUTRITION GOALS:    1. PO intake to meet at least 75%

## 2019-11-18 NOTE — PROGRESS NOTES
[facility]  Cardiology Progress Note    Subjective:  No chest pain or shortness of breath.  Forgetful     Objective:  /57 (BP Location: Right arm)   Pulse 60   Temp 97.4 °F (36.3 °C) (Oral)   Resp 16   Ht 5' 5\" (1.651 m)   Wt 154 lb 15.7 oz (70. evaluation of LV diastolic function. 2.  Ventricular septum: The contour showed diastolic flattening and systolic      flattening. 3.  Aortic valve: Mild regurgitation. 4.  Mitral valve: There was mild to moderate regurgitation.   5.  Left atrium: The le

## 2019-11-18 NOTE — PROGRESS NOTES
[employer] device interrogated per GIOVANNA Huber request.     Interrogation report placed in patient's chart.

## 2019-11-18 NOTE — SLP NOTE
ADULT SWALLOWING EVALUATION    ASSESSMENT    ASSESSMENT/OVERALL IMPRESSION:  Patient seen for swallowing evaluation as he was noted to exhibit significant coughing post breakfast this morning. Patient admitted due to 15 pound weight gain and SOB.   He is k Recommendations - Liquid: Thin(single sips by cup only)                        Compensatory Strategies Recommended: No straws; Slow rate;Small bites and sips  Aspiration Precautions: Upright position; Slow rate;Small bites and sips; No straw  Medication Admin Natural;Functional  Symmetry: Within Functional Limits  Strength:  Within Functional Limits  Tone: Within Functional Limits  Range of Motion: Within Functional Limits  Rate of Motion: Within Functional Limits    Voice Quality: Clear  Respiratory Status: (ea

## 2019-11-18 NOTE — PLAN OF CARE
Daughter Franko Harvey here to take patient to [place]. Discharge instructions reviewed with daughter as well, verbalized understanding. Avs/prescription given to teri.

## 2019-11-18 NOTE — CM/SW NOTE
11/18/19 1700   Discharge disposition   Expected discharge disposition [facility]   Name of Facillity/Home Care/Hospice [facility]   Additional Home Care/Hospice Provider   [facility] and Palliative care)   Discharge transportation Private car

## 2019-11-19 NOTE — DISCHARGE SUMMARY
[facility]  DISCHARGE SUMMARY     Ortiz Link Patient Status:  Inpatient    1929 MRN [id number]   [facility] 2NE-A Attending No att. providers found   Hosp Day # 3 PCP Eliane Manley DO     Date of Admission: 2019 edema. No chest pain. No productive cough. No nausea, vomiting, diarrhea. Brief Synopsis:   80year-old with multiple admissions for heart failure presented again with complaints of shortness of breath. He has been followed this admission by cardiology. markedly reduced. 7.  Right atrium: The atrium was markedly dilated. Pacer C/W ICD noted in      right atrium. 8.  Tricuspid valve: There was mild-moderate regurgitation.   9.  Pulmonary arteries: Systolic pressure was mildly increased, estimated to 30 tablet  Refills:  1     aspirin 81 MG Tabs      Take 81 mg by mouth daily. Refills:  0     Citalopram Hydrobromide 10 MG Tabs  Commonly known as:  CeleXA      Take 20 mg by mouth daily.    Refills:  0     FLOMAX 0.4 MG Caps  Generic drug:  tamsulosin H Respiratory: good   inspiratory effort. Course  to auscultation bilaterally. No rhonchi. RA   Cardiovascular: S1, S2. Regular rhythm, regular rate. No murmurs, rubs or gallops. Paced   Abdomen: Soft, nontender, nondistended.     Musculoskeletal: Moves a

## 2019-11-22 ENCOUNTER — APPOINTMENT (OUTPATIENT)
Dept: GENERAL RADIOLOGY | Facility: HOSPITAL | Age: 84
DRG: 534 | End: 2019-11-22
Attending: EMERGENCY MEDICINE
Payer: MEDICARE

## 2019-11-22 ENCOUNTER — APPOINTMENT (OUTPATIENT)
Dept: CT IMAGING | Facility: HOSPITAL | Age: 84
DRG: 534 | End: 2019-11-22
Attending: EMERGENCY MEDICINE
Payer: MEDICARE

## 2019-11-22 ENCOUNTER — HOSPITAL ENCOUNTER (INPATIENT)
Facility: HOSPITAL | Age: 84
LOS: 1 days | Discharge: HOSPICE | DRG: 534 | End: 2019-11-23
Attending: EMERGENCY MEDICINE | Admitting: HOSPITALIST
Payer: MEDICARE

## 2019-11-22 ENCOUNTER — APPOINTMENT (OUTPATIENT)
Dept: GENERAL RADIOLOGY | Facility: HOSPITAL | Age: 84
DRG: 534 | End: 2019-11-22
Attending: ORTHOPAEDIC SURGERY
Payer: MEDICARE

## 2019-11-22 DIAGNOSIS — S72.001A CLOSED FRACTURE OF RIGHT HIP, INITIAL ENCOUNTER (HCC): Primary | ICD-10-CM

## 2019-11-22 PROCEDURE — 73030 X-RAY EXAM OF SHOULDER: CPT | Performed by: EMERGENCY MEDICINE

## 2019-11-22 PROCEDURE — 71045 X-RAY EXAM CHEST 1 VIEW: CPT | Performed by: EMERGENCY MEDICINE

## 2019-11-22 PROCEDURE — 73502 X-RAY EXAM HIP UNI 2-3 VIEWS: CPT | Performed by: EMERGENCY MEDICINE

## 2019-11-22 PROCEDURE — 70450 CT HEAD/BRAIN W/O DYE: CPT | Performed by: EMERGENCY MEDICINE

## 2019-11-22 PROCEDURE — 73552 X-RAY EXAM OF FEMUR 2/>: CPT | Performed by: ORTHOPAEDIC SURGERY

## 2019-11-22 PROCEDURE — 99223 1ST HOSP IP/OBS HIGH 75: CPT | Performed by: HOSPITALIST

## 2019-11-22 RX ORDER — POLYETHYLENE GLYCOL 3350 17 G/17G
17 POWDER, FOR SOLUTION ORAL DAILY PRN
Status: DISCONTINUED | OUTPATIENT
Start: 2019-11-22 | End: 2019-11-23

## 2019-11-22 RX ORDER — FUROSEMIDE 10 MG/ML
20 INJECTION INTRAMUSCULAR; INTRAVENOUS ONCE
Status: COMPLETED | OUTPATIENT
Start: 2019-11-22 | End: 2019-11-22

## 2019-11-22 RX ORDER — TRAMADOL HYDROCHLORIDE 50 MG/1
50 TABLET ORAL EVERY 6 HOURS PRN
Status: DISCONTINUED | OUTPATIENT
Start: 2019-11-22 | End: 2019-11-23

## 2019-11-22 RX ORDER — MORPHINE SULFATE 4 MG/ML
1 INJECTION, SOLUTION INTRAMUSCULAR; INTRAVENOUS EVERY 2 HOUR PRN
Status: DISCONTINUED | OUTPATIENT
Start: 2019-11-22 | End: 2019-11-23

## 2019-11-22 RX ORDER — MORPHINE SULFATE 4 MG/ML
4 INJECTION, SOLUTION INTRAMUSCULAR; INTRAVENOUS EVERY 2 HOUR PRN
Status: DISCONTINUED | OUTPATIENT
Start: 2019-11-22 | End: 2019-11-23

## 2019-11-22 RX ORDER — MONTELUKAST SODIUM 10 MG/1
10 TABLET ORAL NIGHTLY
Status: DISCONTINUED | OUTPATIENT
Start: 2019-11-22 | End: 2019-11-23

## 2019-11-22 RX ORDER — MORPHINE SULFATE 4 MG/ML
2 INJECTION, SOLUTION INTRAMUSCULAR; INTRAVENOUS EVERY 2 HOUR PRN
Status: DISCONTINUED | OUTPATIENT
Start: 2019-11-22 | End: 2019-11-23

## 2019-11-22 RX ORDER — DOCUSATE SODIUM 100 MG/1
100 CAPSULE, LIQUID FILLED ORAL 2 TIMES DAILY
Status: DISCONTINUED | OUTPATIENT
Start: 2019-11-22 | End: 2019-11-23

## 2019-11-22 RX ORDER — ACETAMINOPHEN 325 MG/1
650 TABLET ORAL EVERY 6 HOURS PRN
Status: DISCONTINUED | OUTPATIENT
Start: 2019-11-22 | End: 2019-11-23

## 2019-11-22 RX ORDER — BISACODYL 10 MG
10 SUPPOSITORY, RECTAL RECTAL
Status: DISCONTINUED | OUTPATIENT
Start: 2019-11-22 | End: 2019-11-23

## 2019-11-22 RX ORDER — ONDANSETRON 2 MG/ML
4 INJECTION INTRAMUSCULAR; INTRAVENOUS EVERY 6 HOURS PRN
Status: DISCONTINUED | OUTPATIENT
Start: 2019-11-22 | End: 2019-11-23

## 2019-11-22 RX ORDER — CETIRIZINE HYDROCHLORIDE 10 MG/1
10 TABLET ORAL DAILY
Status: DISCONTINUED | OUTPATIENT
Start: 2019-11-22 | End: 2019-11-23

## 2019-11-22 RX ORDER — SODIUM PHOSPHATE, DIBASIC AND SODIUM PHOSPHATE, MONOBASIC 7; 19 G/133ML; G/133ML
1 ENEMA RECTAL ONCE AS NEEDED
Status: DISCONTINUED | OUTPATIENT
Start: 2019-11-22 | End: 2019-11-23

## 2019-11-22 RX ORDER — AMIODARONE HYDROCHLORIDE 200 MG/1
200 TABLET ORAL DAILY
Status: DISCONTINUED | OUTPATIENT
Start: 2019-11-22 | End: 2019-11-23

## 2019-11-22 RX ORDER — ALFUZOSIN HYDROCHLORIDE 10 MG/1
10 TABLET, EXTENDED RELEASE ORAL
Status: DISCONTINUED | OUTPATIENT
Start: 2019-11-23 | End: 2019-11-23

## 2019-11-22 RX ORDER — MORPHINE SULFATE 4 MG/ML
2 INJECTION, SOLUTION INTRAMUSCULAR; INTRAVENOUS EVERY 2 HOUR PRN
Status: DISCONTINUED | OUTPATIENT
Start: 2019-11-22 | End: 2019-11-22

## 2019-11-22 RX ORDER — TORSEMIDE 20 MG/1
20 TABLET ORAL DAILY
Status: DISCONTINUED | OUTPATIENT
Start: 2019-11-23 | End: 2019-11-23

## 2019-11-22 RX ORDER — ESCITALOPRAM OXALATE 10 MG/1
10 TABLET ORAL DAILY
Status: DISCONTINUED | OUTPATIENT
Start: 2019-11-23 | End: 2019-11-23

## 2019-11-22 RX ORDER — METOCLOPRAMIDE HYDROCHLORIDE 5 MG/ML
10 INJECTION INTRAMUSCULAR; INTRAVENOUS EVERY 8 HOURS PRN
Status: DISCONTINUED | OUTPATIENT
Start: 2019-11-22 | End: 2019-11-23

## 2019-11-22 RX ORDER — MEMANTINE HYDROCHLORIDE 10 MG/1
10 TABLET ORAL 2 TIMES DAILY
Status: DISCONTINUED | OUTPATIENT
Start: 2019-11-22 | End: 2019-11-23

## 2019-11-22 RX ORDER — ASPIRIN 81 MG/1
81 TABLET, CHEWABLE ORAL DAILY
Status: DISCONTINUED | OUTPATIENT
Start: 2019-11-23 | End: 2019-11-23

## 2019-11-22 RX ORDER — MORPHINE SULFATE 4 MG/ML
2 INJECTION, SOLUTION INTRAMUSCULAR; INTRAVENOUS ONCE
Status: COMPLETED | OUTPATIENT
Start: 2019-11-22 | End: 2019-11-22

## 2019-11-22 RX ORDER — PRIMIDONE 50 MG/1
50 TABLET ORAL 3 TIMES DAILY
Status: DISCONTINUED | OUTPATIENT
Start: 2019-11-22 | End: 2019-11-23

## 2019-11-22 NOTE — ED NOTES
Rpt called to floor. Spoke with gia hurtado.  Will wait for room to be clean prior to arranging transport

## 2019-11-22 NOTE — PROGRESS NOTES
Spoke with Dr. Ashlee Montoya, pt can be WBAT, non-surgical. No ABDUCTION. [id number] Carlita Maciel for pt to eat. Informed family. MD to come see family in an hour.

## 2019-11-22 NOTE — CM/SW NOTE
Family requesting that patient go to [facility] upon discharge. Prior to results of xrays, I began [place] process for [facility]. Kai Zamorano aware that patient is being admitted. Family had long discussion with Dr Leandro Villa regarding hospice care for their father.  Angel Ruano

## 2019-11-22 NOTE — ED NOTES
This rn to bedside to help patient use urinal to provide urine sample. Pt unable to provide sample at this time. Does not want straight catheter right now. Would like to try again later. Will continue to monitor.  Pt instructed to press call light if he fee

## 2019-11-22 NOTE — PLAN OF CARE
Pt A & O x2, drowsy. WBAT. Straight cath x 1, UA sent. Pt unable to void. Skin tear x 2 R elbow with mepilex. Cardiac diet. Non-surgical per ortho Dr. Rajani Lay. No ABDUCTION.  /IS. Tele-NSR. Daughters at bedside. Will continue to monitor.

## 2019-11-22 NOTE — PROGRESS NOTES
Hip fracture binder given to pt's daughters, Lupis [place] Sybil Candelaria. All information reviewed and questions answered. Daughters are waiting to speak to orthopedic surgeon to make final decision regarding surgery.  Currently, they do not want any surgery, but wobilyl

## 2019-11-22 NOTE — H&P
[facility]  History and Physical     Larisa Mcguire Patient Status:  Emergency    1929 MRN [id number]   Location 656 [facility] Attending Blake Herzog MD   Hosp Day # 0 PCP Som Rosales DO     Chief Complaint: Comment:hyperactive    Medications:  No current facility-administered medications on file prior to encounter. torsemide 20 MG Oral Tab, Take 1 tablet (20 mg total) by mouth daily. , Disp: 30 tablet, Rfl: 3  tamsulosin HCl (FLOMAX) 0.4 MG Oral Cap, Take 0. nontender, nondistended. Positive bowel sounds. No rebound, guarding or organomegaly. Extremities: Distal right femur tenderness  Integument: No rashes or lesions. Psychiatric: Appropriate mood and affect.     Diagnostic Data:      Labs:  Recent Labs

## 2019-11-22 NOTE — ED NOTES
This rn and alexander,pct to bedside to help with pt providing urine sample. Pt unable to urinate at this time. MD notified.

## 2019-11-22 NOTE — ED NOTES
This RN to bedside to assess any head injury d/t mechanism of fall and potential LOC. No obvious trauma to head noted at this time.

## 2019-11-22 NOTE — ED PROVIDER NOTES
Patient Seen in: [facility] Emergency Department      History   Patient presents with:  Fall (musculoskeletal, neurologic)    Stated Complaint: Fall- +LOC, C collar in place, L shoulder pain    HPI    70-year-old male presents reporting fall.   He re use: Yes      Alcohol/week: 1.0 standard drinks      Types: 1 Glasses of wine per week      Frequency: Monthly or less      Drinks per session: 1 or 2      Binge frequency: Never    Drug use: Never             Review of Systems    Positive for stated compl Labs Reviewed   COMP METABOLIC PANEL (14) - Abnormal; Notable for the following components:       Result Value    BUN 25 (*)     Creatinine 1.43 (*)     Calcium, Total 8.1 (*)     Calculated Osmolality 296 (*)     GFR, Non- 43 (*)     G transcribed by Technologist)  Pt here after falling. Rt shoulder pain and rt arm skin tear. FINDINGS:  No evidence of acute displaced fracture or dislocation. Osteopenia. Right chest pacemaker.   Mild osteoarthritic changes in the right glenohumeral rafa age-indeterminate microvascular ischemic changes in the cerebral white matter. If there is clinical concern for acute ischemia/infarction, an MRI of the brain would be recommended for further evaluation. 3. 1.9 cm probable polyp in the right nasal cavity. XR CHEST AP PORTABLE  (CPT=71045)  TECHNIQUE:  AP chest radiograph was obtained. COMPARISON:  [facility] , CT, CT CHEST(CONTRAST ONLY) (CPT=71260), 6/15/2019, 12:13. [facility] , XR, XR CHEST AP PORTABLE  (CPT=71045), 10/28/2019, 5:12.   [facility] , XR, XR CHEST AP 10/28/2019 at 8:06          Xr Chest Ap Portable  (cpt=71045)    Result Date: 10/26/2019  PROCEDURE:  XR CHEST AP PORTABLE  (CPT=71045)  TECHNIQUE:  AP chest radiograph was obtained.   COMPARISON:  [facility] , XR, XR CHEST AP PORTABLE  (CPT=71045), 9/30/2019, trochanter. Dictated by: Liane Baxter MD on 11/22/2019 at 10:37     Approved by: Liane Baxter MD on 11/22/2019 at 10:39                MDM     43-year-old male presents after a fall. He is denying any head injury or loss of consciousness to me.   H

## 2019-11-23 VITALS
BODY MASS INDEX: 26.07 KG/M2 | HEART RATE: 60 BPM | RESPIRATION RATE: 16 BRPM | TEMPERATURE: 99 F | SYSTOLIC BLOOD PRESSURE: 104 MMHG | HEIGHT: 66 IN | WEIGHT: 162.19 LBS | DIASTOLIC BLOOD PRESSURE: 50 MMHG | OXYGEN SATURATION: 95 %

## 2019-11-23 PROCEDURE — 99239 HOSP IP/OBS DSCHRG MGMT >30: CPT | Performed by: HOSPITALIST

## 2019-11-23 NOTE — CM/SW NOTE
EHSAN received a call from RN. Family is currently discussing Hospice. SW left a message for pt's Daughter, Jamia [phone] offering assistance. SW left this SW contact information for daughter to return the call. EHSAN updated RNChris December.  She states th

## 2019-11-23 NOTE — CM/SW NOTE
EHSAN spoke to pt's daughter, Jacob Burt ph: [phone]. EHSAN placed an [facility] referral to [facility] per the daughter's request.  The family is requesting [facility]'s inpatient facility on [facility].     EHSAN spoke to [facility] ph: [phone] to

## 2019-11-23 NOTE — PHYSICAL THERAPY NOTE
PHYSICAL THERAPY EVALUATION - INPATIENT     Room Number: 719/445-D  Evaluation Date: 11/23/2019  Type of Evaluation: Initial  Physician Order: PT Eval and Treat    Presenting Problem: fall with right non-displaced greater trochanter fracture adjacent Hearing impairment    • Heart attack ([facility] 1985   • High blood pressure    • High cholesterol    • History of stomach ulcers    • Hyperlipidemia    • Osteoarthritis    • Peripheral vascular disease ([facility]    • Pneumonia due to organism    • Problems with sw -  Dynamic Sitting: Poor  Static Standing: Poor -  Dynamic Standing: Poor -    ADDITIONAL TESTS                                    NEUROLOGICAL FINDINGS                      ACTIVITY TOLERANCE                         O2 WALK        SPO2 Ambulation on Oxyge sequencing and safety; asst for lateral weightshifting bilaterally to promote improved foot clearance; pt with difficulty bearing weight through RLE and demonstrates shuffling/pivoting of LLE to complete transfer     Comments related to Mobility: Pt denies this period of rehabilitation patient should achieve at least supervision level in bed mobility, transfers, and ambulation.     DISCHARGE RECOMMENDATIONS  PT Discharge Recommendations: Sub-acute rehabilitation(ELOS of 18-21 days)    PLAN  PT Treatment Plan:

## 2019-11-23 NOTE — PLAN OF CARE
V/S stable,taking medications by mouth without problems,desats on room air,O2 applied. Voided per urinal,adequate amount. Encouraged po. Safety measures reinfored,call light kept within reach.

## 2019-11-23 NOTE — PROGRESS NOTES
ORTHOPEDIC SURGERY CONSULT FOLLOW-UP NOTE  Ortho Consulting Attending: Madeline Dotson MD  Primary Service: Medicine, Tailor    SUBJECTIVE:   No acute changes overnight, still with pain to right hip    OBJECTIVE:   11/23/19  0733   BP: 105/39   Pulse: 64   R

## 2019-11-23 NOTE — PROGRESS NOTES
[facility]  Progress Note     Zita Yu Patient Status:  Inpatient    1929 MRN [id number]   [facility] 3SW-A Attending Lazarus Handy, [address] [facility] Day # 1 PCP Shruthi Jaramillo, DO     Chief Complaint: Femur fracture    S: Sherrie Moder Inhalation Nightly   • Memantine HCl  10 mg Oral BID   • metoprolol Tartrate  25 mg Oral 2x Daily(Beta Blocker)   • cetirizine  10 mg Oral Daily   • Montelukast Sodium  10 mg Oral Nightly   • primidone  50 mg Oral TID   • torsemide  20 mg Oral Daily   • Al

## 2019-11-23 NOTE — CONSULTS
ORTHOPAEDIC SURGERY CONSULT NOTE    Attending Physician: Mitchel Benson  Consulting Physician: Enedina Key MD    Patient Name: Lena Clemente  Age:  80year old  Sex: male  MRN: [id number]  : 1929  Date of Admission: 2019    REASON FOR CONSULTATIO axial load and log roll into right hip, TTP to greater troch  TTP to bilateral joint lines of right knee  Otherwise NVI distally to RLE with 2+ DP/PT pulses    Pertinent Imaging:  Right hip with minimally displaced GT fracture about RISHI,    ASSESSMENT AND

## 2019-11-24 NOTE — PROGRESS NOTES
NURSING DISCHARGE NOTE    Discharged via ambulance  Accompanied by daughter  Belongings all sent with the pt  Paperworks sent as well as POA and DNR forms to the ambulance. Report given by the day shift RN.  As per day shift RN, IV to be left in and not

## 2019-11-24 NOTE — PLAN OF CARE
REPORT CALLED TO MARTIN AT [facility]. RN REQ TO LEAVE HL IN PLACE. DAUGHTER AT [facility] 97. AND PT ALSO AWARE. AMB ARRANGED AT 0815 PER CRN.  PT AWAKE WATCHING TV.

## 2019-11-24 NOTE — PLAN OF CARE
[facility] CALLED AND ASKING TO ARRANGED FOR AMB FOR PT TO TRANSFER [place] [facility].  EHSAN FROM ER WAS PAGED HOW TRANSFER WILL BE SET UP. ADVICE TO CALL AMB [id number] FOR . DR Yaakov Nelson WAS PAGED. FOR ORDER. PER REA SEASON COORDINATOR D

## 2019-11-24 NOTE — DISCHARGE SUMMARY
[facility] HOSPITALIST  DISCHARGE SUMMARY     Christa Kwan Patient Status:  Inpatient    1929 MRN [id number]   [facility] 3SW-A Attending Kai Kendrick MD   Hosp Day # 1 PCP Nato Chang DO     Date of Admission: 2019  Date as: TYLENOL      Take 650 mg by mouth every 6 (six) hours as needed for Pain. Refills:  0     amiodarone HCl 200 MG Tabs  Commonly known as:  PACERONE      Take 1 tablet (200 mg total) by mouth daily.    Quantity:  30 tablet  Refills:  1     aspirin 81 M chart    Jessy Plunkett MD    Time spent:  > 30 minutes

## 2019-11-24 NOTE — PLAN OF CARE
Pt alert/oriented. Recent fall at assisted living where he lives. Right hip fx, right shoulder pain. Sit-to-stand with transfers. Morphine given for pain. SBP 90-100s, HR 90-100s.   [facility] consulted, spoke with family re: inpatient hospice at

## 2020-01-17 ENCOUNTER — APPOINTMENT (OUTPATIENT)
Dept: CARDIOLOGY | Age: 85
End: 2020-01-17
Attending: INTERNAL MEDICINE

## 2020-02-26 ENCOUNTER — APPOINTMENT (OUTPATIENT)
Dept: CARDIOLOGY | Age: 85
End: 2020-02-26

## 2020-05-11 ENCOUNTER — ADVANCED DIRECTIVES (OUTPATIENT)
Dept: HEALTH INFORMATION MANAGEMENT | Age: 85
End: 2020-05-11

## 2020-12-01 NOTE — PROGRESS NOTES
09/06/19 2118   BiPAP   $ RT Standby Charge (per 15 min) 1   Device V60   BiPAP / CPAP CE# 6053   Mode Spontaneous/Timed   Interface Full face mask   Mask Size Medium   Control Settings   Set Rate 14 breaths/min   Set IPAP 12   Set EPAP 6   Oxygen Perce Clothing

## (undated) NOTE — LETTER
Yandy Rios 182 6 [address] [place] [address]  daydayfernando, [address] [place] [address]    Consent for Operation  Date: ______02/23/2019___                                Time: _____1100______    1.  I authorize the performance upon Ashiappsilvestre Smith the following operation:  Right ult videotape. The [facility] will not be responsible for storage or maintenance of this tape. 5. For the purpose of advancing medical education, I consent to the admittance of observers to the Operating Room.   6. I authorize the use of any specimen, organs, ti When the patient is a minor or mentally incompetent to give consent:  Signature of person authorized to consent for patient: ___________________________   Relationship to patient: ____________________________________________________    Signature of Witness these medicines may increase my risk of anesthetic complications. iv. If I am allergic to anything or have had a reaction to anesthesia before. 3. I understand how the anesthesia medicine will help me (benefits).   4. I understand that with any type of an patient’s representative) and answered their questions. The patient or their representative has agreed to have anesthesia services.     _____________________________________________________________________________  Witness        Date   Time  I have dominique

## (undated) NOTE — IP AVS SNAPSHOT
Patient Demographics     Address  [employer] Phone  [phone] [facility])  [phone] (Mobile) *Preferred* E-mail Address  [email or web address]      Emergency Contact(s)     Name Relation Home Work Mobile    Marie Smith Daughter 611- Take 1 tablet (25 mg total) by mouth 2x Daily(Beta Blocker). Penny Segal MD         Montelukast Sodium 10 MG Tabs  Commonly known as:  SINGULAIR      Take 1 tablet (10 mg total) by mouth nightly.    Bhumi Ruiz MD         primidone 50 MG Tabs [id number] torsemide [facility]) tab 20 mg 11/23/19 0807 Given      [id number] traMADol HCl (ULTRAM) tab 50 mg 11/22/19 2205 Given      [id number] traMADol HCl (ULTRAM) tab 50 mg 11/23/19 0750 Given              Recent Vital Signs       Most Recent Value   Vit ---------                               -----------         ------                     CBC W/ DIFFERENTIAL[[id number]]          Abnormal            Final result                 Please view results for these tests on the individual orders.     Specimen Inform was recently admitted for heart failure. Patient with fall overnight. Patient had to crawl to floor. He complained of right knee pain. He admits to shortness of breath. No nausea or vomiting. [PT.2]    Past Medical History:  Past Medical History:   Michelle Chan 2x Daily(Beta Blocker). , Disp: 60 tablet, Rfl: 1  fluticasone-salmeterol 250-50 MCG/DOSE Inhalation Aerosol Powder, Breath Activated, Inhale 1 puff into the lungs every 12 (twelve) hours. , Disp: , Rfl:   ipratropium-albuterol 0.5-2.5 (3) MG/3ML Inhalation CREATSERUM 1.10 1.06 1.43*   GFRAA 68 72 50*   GFRNAA 59* 62 43*   CA 7.8* 8.5 8.1*   ALB  --   --  2.7*    140 141   K 4.2  4.1 4.1 4.5    108 112   CO2 27.0 27.0 28.0   ALKPHO  --   --  87   AST  --   --  33   ALT  --   --  31   BILT  --   -- :  Minesh Yeboah MD (Physician)     Consult Orders    1.  ED Consult to Orthopedic Surgery [[id number]] ordered by Brielle Mckinney MD at 11/22/19 [facility] NOTE    Attending Physician: Juliocesar Cabrera Musculoskeletal: Right hip pain, Right knee pain  Neurological: neg  Skin: neg  14 point review of systems negative except as noted in HPI and above systems    PHYSICAL EXAM:  @auvitals@    TTP with axial load and log roll into right hip, TTP to greater tr Physical Therapy Note signed by Robert Ingram PT at 11/23/2019  9:33 AM  Version 1 of 1    Author:  Robert Ingram PT Service:  Rehab Author Type:  Physical Therapist    Filed:  11/23/2019  9:33 AM Date of Service:  11/23/2019  9:05 AM Status:  Sign Diagnosis Date   • Anxiety state    • Arrhythmia     afib   • Asthma    • Atherosclerosis of coronary artery    • Congestive heart disease (HCC)    • COPD (chronic obstructive pulmonary disease) (HCC)    • Coronary atherosclerosis    • Dementia (HCC)    • Upper extremity ROM and strength are within functional limits except for the following:    shoulder flexion limited bilaterally right>left    Lower extremity ROM is within functional limits    Lower extremity strength is within functional limits except for verbalizes understanding.       Supine to Sit: max asst x 2  Sit to supine: not assessed  Sit to Stand:mod asst x 2 with rolling walker; verbal and tactile cues for hand placement  Stand to sit: mod asst x 2 with rolling walker; maxverbal and tactile cues f overall the evaluation complexity is considered moderate. These impairments and comorbidities manifest themselves as functional limitations in independent bed mobility, transfers, and gait.   The patient is below baseline and would benefit from skilled inp

## (undated) NOTE — LETTER
Yandy Rios 182 066 [facility], [address] [facility]     PICC LINE INSERTION CONSENT     I agree to have a Peripherally Inserted Central Catheter (PICC) placed in my arm.    1. The PICC insertion procedure, care, maintenance, risks, benefits, and c goals; and potential problems that might occur during recuperation.  They also discussed reasonable alternatives to the PICC, including risks, benefits, and side effects related to the alternatives and risks related to not receiving this procedure      ____

## (undated) NOTE — IP AVS SNAPSHOT
Patient Demographics     Address  [employer] Phone  [phone] [facility])  [phone] (Mobile) *Preferred* E-mail Address  [email or web address]      Emergency Contact(s)     Name Relation Home Work Mobile    Marie Smith Daughter 362- Take 25 mg by mouth 2 (two) times daily. Montelukast Sodium 10 MG Tabs  Commonly known as:  SINGULAIR  Next dose due: Tonight 9/14      Take 1 tablet (10 mg total) by mouth nightly.    Ayla Ann MD         primidone 50 MG Tabs  Commonly k [id number] primidone (MYSOLINE) tab 50 mg 09/13/19 2012 Given      [id number] primidone (MYSOLINE) tab 50 mg 09/14/19 0825 Given            LEFT LOWER ABDOMEN     Order ID Medication Name Action Time Action Reason Comments    [id number] Enoxaparin Sodium (LO Christine 106 LAB Zelda Jones  [address] [id number] 02/03/16 1201 - Present            Microbiology Results (All)     Procedure Component Value Units Date/Time    Blood Culture FREQ X 2 [[id number]] Collected:  09 Mycoplasma pneumonia PCR: Negative         H&P - H&P Note      H&P signed by Abhay Castillo MD at 9/5/2019 11:44 PM  Version 1 of 1    Author:  Abhay Castillo MD Service:  — Author Type:  Physician    Filed:  9/5/2019 11:44 PM Date of Service:  9/5/201 Current Outpatient Medications on File Prior to Encounter:  Levocetirizine Dihydrochloride 5 MG Oral Tab Take 5 mg by mouth every evening. Disp:  Rfl:    furosemide 20 MG Oral Tab Take 1 tablet (20 mg total) by mouth daily. (Patient taking differently:  [facility] Musculoskeletal: Moves all extremities. Extremities: No edema or cyanosis. Integument: No rashes or lesions. Psychiatric: Appropriate mood and affect.       Diagnostic Data:      Labs:  Recent Labs   Lab 09/05/19 2047   WBC 8.6   HGB 12.1*   .2* Consults signed by Raegan Alvarez MD at 9/6/2019  7:15 AM     Author:  Raegan Alvarez MD Service:  Pulmonology Author Type:  Physician    Filed:  9/6/2019  7:15 AM Date of Service:  9/6/2019  7:02 AM Status:  Addendum    :  David Britt dropping. He has no complaints of pain but does continue to cough and feel cold.     PAST HISTORY:    · Coronary artery disease status post coronary artery bypass grafting   · Atrial fibrillation prompting insertion of an AV sequential pacemaker   · Recurr Metoprolol Succinate ER 25 MG Oral Tablet 24 Hr Take 25 mg by mouth 2 (two) times daily. Disp:  Rfl:  Taking   Memantine HCl 10 MG Oral Tab Take 10 mg by mouth 2 (two) times daily.  Disp:  Rfl:  Taking     Review of Systems: A general review of systems is CA 8.6 7.5*    141   K 3.9 3.6    110   CO2 26.0 23.0     No results for input(s): ABGPHT, DPPFIP8Q, WPOGW1Q, ABGHCO3, ABGBE, TEMP, ALEX, SITE, DEV, THGB in the last 168 hours.     Invalid input(s): NQA22QCQ, CHOB      Cultures: No new/positive Discharge Summary - D/C Summary      Discharge Summary signed by Chago Cardona DO at 9/14/2019 11:18 AM  Version 1 of 1    Author:  Chago Cardona DO Service:  Hospitalist Author Type:  Physician    Filed:  9/14/2019 11:18 AM Date of Service:  9/14/2019 1 Brief Synopsis: Patient admitted to MICU where he was managed for Septic shock and acute respiratory failure. He did not need to be intubated and was managed w/NIPPV and vasopressors via PICC line which was placed. Patient's breathing and BP stabalized.  Pr STOP taking these medications    furosemide 20 MG Tabs  Commonly known as:  LASIX               [place]: [place]    Follow-up appointment:   King Khanna [address] [facility]  [phone]            Vital sig sternotomy approximated by wire sutures. Atheromatous calcifications of the aorta. Degenerative changes of the thoracic spine. CONCLUSION:  1. Stable cardiomegaly.  2. Mild interstitial opacities which may represent chronic interstitial changes versu Coronary artery disease involving native coronary artery of native heart    Elevated troponin    Hypoxia    Sepsis with acute organ dysfunction and septic shock (HCC)    Septic shock (HCC)    Acute respiratory failure (HCC)    History of coronary artery blankets)?: A Little   -   Sitting down on and standing up from a chair with arms (e.g., wheelchair, bedside commode, etc.): A Little   -   Moving from lying on back to sitting on the side of the bed?: A Little[NL.2]   How much help from another person yousif impaired cardiopulmonary endurance as evidenced by shortness of breath and oxygen desaturation with ambulation. The pt also continues to exhibit impaired gait dynamics and impaired gait speed.   The patient ambulates with self-selected gait speed that filiberto Author:  Kin Hoover OT Service:  Freddy Ni Author Type:  Occupational Therapist    Filed:  9/13/2019  2:50 PM Date of Service:  9/13/2019  2:38 PM Status:  Signed    :  Kin Hoover OT (Occupational Therapist)       OCCUPATIONAL THERAPY GURMEET • PACEMAKER/DEFIBRILLATOR  2010   • TOTAL HIP REPLACEMENT Right 2015       OCCUPATIONAL PROFILE    HOME SITUATION  Type of Home: Independent living facility([place])  Home Layout: One level  Lives With: Alone    Toilet and Equipment: Comfort height toile O2 SATURATIONS                ACTIVITIES OF DAILY LIVING ASSESSMENT  AM-PAC ‘6-Clicks’ Inpatient Daily Activity Short Form  How much help from another person does the patient currently need…  -   Putting on and taking off regular lower body clothing?: deficits impact the patient’s ability to participate in ADL, instrumental activities of daily living, rest and sleep, work, leisure and social participation. Subacute rehab is recommended for 11 to 13 days.   After this period of rehabilitation patient sanjuana Attribution Portillo    MS. 1 - Fortino Larissa Eden on 9/13/2019  2:38 PM                        Video Swallow Study Note - Video Swallow Study Notes      Video Swallow Study Note signed by LIDIA Padron at 9/10/2019  1:36 PM  Version 1 of 1    Author:  Kirk Eaton • Dementia ([facility]    • Depression    • Hearing impairment    • Heart attack ([facility] 1985   • High blood pressure    • High cholesterol    • History of stomach ulcers    • Peripheral vascular disease ([facility]    • Pneumonia due to organism    • Shortness of breat Bolus Retrieval (VFSS - Nectar Thick Liquids): Intact  Bilabial Seal (VFSS - Nectar Thick Liquids): Intact  Bolus Formation (VFSS - Nectar Thick Liquids): Impaired  Bolus Propulsion (VFSS - Nectar Thick Liquids):  Impaired  Retention (VFSS - Alfred Thick Annie Moan Strategy(ies) Implemented (Puree): Slow rate;Small bites and sips  Effectiveness: Partial     HARD SOLID  Oral Phase of Swallow (VFSS - Hard Solid): Impaired  Bolus Retrieval (VFSS - Hard Solid): Intact  Bilabial Seal (VFSS - Hard Solid):  Intact  Bolus For accuracy over 1-2 session(s). Not Addressed   Goal #2 The patient/family/caregiver will demonstrate understanding and implementation of aspiration precautions and swallow strategies independently over 1-2 session(s).     Not Addressed   Goal #3 Pt will inc SLP Note - SLP Notes      SLP Note signed by LIDIA Mei at 9/11/2019  4:07 PM  Version 1 of 1    Author:  LIDIA Mei Service:  Rehab Author Type:  SPEECH-LANGUAGE PATHOLOGIST    Filed:  9/11/2019  4:07 PM Date of Service:  9/11/2019 Goal #3 Pt will increase tongue base retraction and strengthen pharyngeal wall peristalsis by completing Clementine exercise 10x with max verbal and visual cues to decrease residue in pharynx.   Not Addressed   Goal #4 Pt will increase range of hyolaryngeal irma

## (undated) NOTE — LETTER
Yandy Rios 182 6 [address]  [place], [address] [facility]    Consent for Operation  Date: _____02/24/2019______                                Time: _____0750______    1.  I authorize the performance upon Jason Smith the following operation: right ch videotape. The [facility] will not be responsible for storage or maintenance of this tape. 6. For the purpose of advancing medical education, I consent to the admittance of observers to the Operating Room.   7. I authorize the use of any specimen, organs, ti When the patient is a minor or mentally incompetent to give consent:  Signature of person authorized to consent for patient: ___________________________   Relationship to patient: ____________________________________________________    Signature of Witness these medicines may increase my risk of anesthetic complications. iv. If I am allergic to anything or have had a reaction to anesthesia before. 3. I understand how the anesthesia medicine will help me (benefits).   4. I understand that with any type of an patient’s representative) and answered their questions. The patient or their representative has agreed to have anesthesia services.     _____________________________________________________________________________  Witness        Date   Time  I have dominique

## (undated) NOTE — ED AVS SNAPSHOT
[facility] Emergency Department    [place]  [address] [address]    Phone:  [phone]    Fax:  Christine Yvrose Luis   MRN: [id number]    Department:  [facility] Emergency Department   Date of Visit:  2/10/ IF THERE IS ANY CHANGE OR WORSENING OF YOUR CONDITION, CALL YOUR PRIMARY CARE PHYSICIAN AT ONCE OR RETURN IMMEDIATELY TO THE EMERGENCY DEPARTMENT.     If you have been prescribed any medication(s), please fill your prescription right away and begin taking t

## (undated) NOTE — ED AVS SNAPSHOT
Navjot Grantook   MRN: [id number]    Department:  [facility] Emergency Department   Date of Visit:  11/16/2017           Disclosure     Insurance plans vary and the physician(s) referred by the ER may not be covered by your plan.  Please contact you If you have been prescribed any medication(s), please fill your prescription right away and begin taking the medication(s) as directed    If the emergency physician has read X-rays, these will be re-interpreted by a radiologist.  If there is a significant

## (undated) NOTE — ED AVS SNAPSHOT
[facility] Emergency Department    [place]  [address] [address]    Phone:  [phone]    Fax:  Christine Yvrose Luis   MRN: [id number]    Department:  [facility] Emergency Department   Date of Visit:  2/10/ covered by your plan. Please contact your insurance company to determine coverage for follow-up care and referrals.     [address] [employer] [place] [phone]  Pediatric [phone] Emergency Department   [phone]       To by a radiologist.  If there is a significant change in your reading, you will be contacted. Please make sure we have your correct phone number before you leave. After you leave, you should follow the attached instructions.      I have read and understand th Christine 112. MyChart     Sign up for [facility], your secure online medical record. [employer] will allow you to access patient instructions from your recent visit,  view other health information, and more.  To sign up or find more information,